# Patient Record
Sex: MALE | Race: WHITE | NOT HISPANIC OR LATINO | Employment: FULL TIME | URBAN - METROPOLITAN AREA
[De-identification: names, ages, dates, MRNs, and addresses within clinical notes are randomized per-mention and may not be internally consistent; named-entity substitution may affect disease eponyms.]

---

## 2023-04-17 DIAGNOSIS — F98.8 ADD (ATTENTION DEFICIT DISORDER) WITHOUT HYPERACTIVITY: Primary | ICD-10-CM

## 2023-04-17 RX ORDER — ATOMOXETINE 60 MG/1
60 CAPSULE ORAL DAILY
COMMUNITY
End: 2023-04-17 | Stop reason: SDUPTHER

## 2023-04-17 NOTE — TELEPHONE ENCOUNTER
Patient called, needs refill of Strattera 50 mg sent to [facility] in [place].  He wanted an appt with Dr. Joyner for follow-up for med refills, but nothing was available.  I got him scheduled to see Dr. Corrigan for ROSALES and med refills on Tues., 5/16/2023, but patient will be out of his Strattera this week.  Can he at least get enough to get him through until his appt?

## 2023-04-18 RX ORDER — ATOMOXETINE 60 MG/1
60 CAPSULE ORAL DAILY
Qty: 30 CAPSULE | Refills: 0 | Status: SHIPPED | OUTPATIENT
Start: 2023-04-18 | End: 2023-05-23 | Stop reason: SDUPTHER

## 2023-04-20 ENCOUNTER — TELEPHONE (OUTPATIENT)
Dept: PRIMARY CARE | Facility: CLINIC | Age: 43
End: 2023-04-20
Payer: COMMERCIAL

## 2023-04-20 NOTE — TELEPHONE ENCOUNTER
Pt calling for his refill on Strattera 60mg, he is leaving town tomorrow for a few weeks and states he has called this week for this refill and hasn't heard anything yet. [facility] in [place] is his pharmacy.

## 2023-05-16 ENCOUNTER — APPOINTMENT (OUTPATIENT)
Dept: PRIMARY CARE | Facility: CLINIC | Age: 43
End: 2023-05-16
Payer: COMMERCIAL

## 2023-05-22 ENCOUNTER — TELEPHONE (OUTPATIENT)
Dept: PRIMARY CARE | Facility: CLINIC | Age: 43
End: 2023-05-22
Payer: COMMERCIAL

## 2023-05-22 NOTE — TELEPHONE ENCOUNTER
Pt left a msg stating that he onl has 3 of his Strattera 50mg left.  His appt with Dr. Corrigan is not til 6/7.  He is needing a refill to cover til then.  Pharm is [facility] [place].

## 2023-05-23 DIAGNOSIS — F98.8 ADD (ATTENTION DEFICIT DISORDER) WITHOUT HYPERACTIVITY: ICD-10-CM

## 2023-05-24 RX ORDER — ATOMOXETINE 60 MG/1
60 CAPSULE ORAL DAILY
Qty: 20 CAPSULE | Refills: 0 | Status: SHIPPED | OUTPATIENT
Start: 2023-05-24 | End: 2023-06-07 | Stop reason: SDUPTHER

## 2023-06-07 ENCOUNTER — OFFICE VISIT (OUTPATIENT)
Dept: PRIMARY CARE | Facility: CLINIC | Age: 43
End: 2023-06-07
Payer: COMMERCIAL

## 2023-06-07 VITALS
SYSTOLIC BLOOD PRESSURE: 116 MMHG | WEIGHT: 196 LBS | BODY MASS INDEX: 24.52 KG/M2 | TEMPERATURE: 97.7 F | HEART RATE: 97 BPM | DIASTOLIC BLOOD PRESSURE: 72 MMHG | OXYGEN SATURATION: 98 %

## 2023-06-07 DIAGNOSIS — F98.8 ADD (ATTENTION DEFICIT DISORDER) WITHOUT HYPERACTIVITY: ICD-10-CM

## 2023-06-07 DIAGNOSIS — M54.42 ACUTE LEFT-SIDED LOW BACK PAIN WITH LEFT-SIDED SCIATICA: Primary | ICD-10-CM

## 2023-06-07 DIAGNOSIS — N52.9 ERECTILE DYSFUNCTION, UNSPECIFIED ERECTILE DYSFUNCTION TYPE: ICD-10-CM

## 2023-06-07 PROBLEM — M54.9 BACK PAIN, CHRONIC: Status: ACTIVE | Noted: 2017-11-01

## 2023-06-07 PROBLEM — G89.29 BACK PAIN, CHRONIC: Status: ACTIVE | Noted: 2017-11-01

## 2023-06-07 PROBLEM — M51.36 DEGENERATIVE DISC DISEASE, LUMBAR: Status: ACTIVE | Noted: 2017-02-13

## 2023-06-07 PROBLEM — M51.369 DEGENERATIVE DISC DISEASE, LUMBAR: Status: ACTIVE | Noted: 2017-02-13

## 2023-06-07 PROCEDURE — 99214 OFFICE O/P EST MOD 30 MIN: CPT | Performed by: STUDENT IN AN ORGANIZED HEALTH CARE EDUCATION/TRAINING PROGRAM

## 2023-06-07 PROCEDURE — 1036F TOBACCO NON-USER: CPT | Performed by: STUDENT IN AN ORGANIZED HEALTH CARE EDUCATION/TRAINING PROGRAM

## 2023-06-07 RX ORDER — TADALAFIL 20 MG/1
20 TABLET ORAL DAILY PRN
COMMUNITY
End: 2023-06-07 | Stop reason: SDUPTHER

## 2023-06-07 RX ORDER — PREDNISONE 20 MG/1
40 TABLET ORAL
Qty: 10 TABLET | Refills: 0 | Status: SHIPPED | OUTPATIENT
Start: 2023-06-07 | End: 2023-06-12

## 2023-06-07 RX ORDER — TADALAFIL 20 MG/1
20 TABLET ORAL DAILY PRN
Qty: 10 TABLET | Refills: 2 | Status: SHIPPED | OUTPATIENT
Start: 2023-06-07 | End: 2024-01-25 | Stop reason: SDUPTHER

## 2023-06-07 RX ORDER — ATOMOXETINE 60 MG/1
60 CAPSULE ORAL DAILY
Qty: 90 CAPSULE | Refills: 3 | Status: SHIPPED | OUTPATIENT
Start: 2023-06-07 | End: 2024-01-25 | Stop reason: SDUPTHER

## 2023-06-07 ASSESSMENT — ENCOUNTER SYMPTOMS
NUMBNESS: 0
WHEEZING: 0
DYSPHORIC MOOD: 0
PALPITATIONS: 0
HEMATURIA: 0
NERVOUS/ANXIOUS: 0
FREQUENCY: 0
DIZZINESS: 0
DIARRHEA: 0
FATIGUE: 0
COUGH: 0
FEVER: 0
SHORTNESS OF BREATH: 0
CHILLS: 0
NAUSEA: 0
CONSTIPATION: 0
DYSURIA: 0
HEADACHES: 0
ABDOMINAL PAIN: 0

## 2023-06-07 ASSESSMENT — PATIENT HEALTH QUESTIONNAIRE - PHQ9
1. LITTLE INTEREST OR PLEASURE IN DOING THINGS: NOT AT ALL
2. FEELING DOWN, DEPRESSED OR HOPELESS: NOT AT ALL
SUM OF ALL RESPONSES TO PHQ9 QUESTIONS 1 AND 2: 0

## 2023-06-07 NOTE — PROGRESS NOTES
Subjective   Patient ID: Asad Juan Jr. is a 42 y.o. male who presents for ADHD and Back Pain.    HPI   Patient needs refill of strattera for ADHD. Working well for him. Has been on it for about 20 years. He previously had problems concentrating. 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    He also takes 20 mg cialis as needed. Needs refill on that as well.    Also has back pain x almost 20 years. Hurt it lifting weights. Had herniated disc and DDD. Had it under controlled. Did PT in 30s. Has seen chiro and massage. Has had injections.  In the last year, he started having pain midline low back. Last few months noticed it more on the L side. Last weeks it has been radiating down his L leg. Sometimes he has trouble walking. Has trouble getting into his car. He started doing his PT exercises every other day and it doesn't seem to help.   No recent injury. No new exercise. No recent triggers.   No n/t. No weakness. No bowel or bladder incontinence. No saddle anesthesia. Does have pain in his left butt area.  He has been taking ASA for it which helps a little bit.   Feels pain in his calf when walking.       Review of Systems   Constitutional:  Negative for chills, fatigue and fever.   Respiratory:  Negative for cough, shortness of breath and wheezing.    Cardiovascular:  Negative for chest pain, palpitations and leg swelling.   Gastrointestinal:  Negative for abdominal pain, constipation, diarrhea and nausea.   Genitourinary:  Negative for dysuria, frequency, hematuria and urgency.   Neurological:  Negative for dizziness, numbness and headaches.   Psychiatric/Behavioral:  Negative for dysphoric mood. The patient is not nervous/anxious.        Objective   /72 (BP Location: Left arm, Patient Position: Sitting, BP Cuff Size: Adult)   Pulse 97   Temp 36.5 °C (97.7 °F) (Temporal)   Wt 88.9 kg (196 lb)   SpO2 98%   BMI 24.52 kg/m²     Physical  Exam  Constitutional:       Appearance: Normal appearance.   HENT:      Head: Normocephalic and atraumatic.   Eyes:      Extraocular Movements: Extraocular movements intact.      Pupils: Pupils are equal, round, and reactive to light.   Cardiovascular:      Rate and Rhythm: Normal rate and regular rhythm.      Heart sounds: Normal heart sounds. No murmur heard.  Pulmonary:      Effort: Pulmonary effort is normal.      Breath sounds: Normal breath sounds. No wheezing.   Abdominal:      General: Bowel sounds are normal.      Palpations: Abdomen is soft.      Tenderness: There is no abdominal tenderness. There is no guarding.   Musculoskeletal:      Lumbar back: Negative right straight leg raise test and negative left straight leg raise test.   Skin:     General: Skin is warm and dry.   Neurological:      General: No focal deficit present.      Mental Status: He is alert and oriented to person, place, and time.   Psychiatric:         Mood and Affect: Mood normal.         Behavior: Behavior normal.     Back Exam     Tenderness   The patient is experiencing tenderness in the lumbar.    Range of Motion   The patient has normal back ROM.    Muscle Strength   The patient has normal back strength.    Tests   Straight leg raise right: negative  Straight leg raise left: negative    Reflexes   Patellar:  normal    Other   Sensation: normal  Gait: normal               Assessment/Plan   Problem List Items Addressed This Visit       ADD (attention deficit disorder) without hyperactivity    Relevant Medications    atomoxetine (Strattera) 60 mg capsule    Erectile dysfunction    Relevant Medications    tadalafil 20 mg tablet     Other Visit Diagnoses       Acute left-sided low back pain with left-sided sciatica    -  Primary    Relevant Medications    predniSONE (Deltasone) 20 mg tablet    Other Relevant Orders    Referral to Physical Therapy          Strattera and tadalafil refilled  We will check lumbar spine x-rays given the  radicular symptoms that he is having  Prescription for prednisone sent to pharmacy  Referral to physical therapy and if not improving may need to consider MRI.  He does have a history of disc issues and sounds related to that.       Patient understands and agrees with treatment plan    Claudine Corrigan DO   06/07/23

## 2023-08-01 ENCOUNTER — TELEPHONE (OUTPATIENT)
Dept: PRIMARY CARE | Facility: CLINIC | Age: 43
End: 2023-08-01
Payer: COMMERCIAL

## 2023-08-01 NOTE — TELEPHONE ENCOUNTER
Patient did reschedule 8/3 apt for 10/31 but he would like a referral to a back specialist because the PT didn't help his back.  Please advise.

## 2023-08-03 ENCOUNTER — APPOINTMENT (OUTPATIENT)
Dept: PRIMARY CARE | Facility: CLINIC | Age: 43
End: 2023-08-03
Payer: COMMERCIAL

## 2023-08-31 ENCOUNTER — TELEPHONE (OUTPATIENT)
Dept: PRIMARY CARE | Facility: CLINIC | Age: 43
End: 2023-08-31
Payer: COMMERCIAL

## 2023-09-01 DIAGNOSIS — M54.9 CHRONIC BACK PAIN, UNSPECIFIED BACK LOCATION, UNSPECIFIED BACK PAIN LATERALITY: ICD-10-CM

## 2023-09-01 DIAGNOSIS — G89.29 CHRONIC BACK PAIN, UNSPECIFIED BACK LOCATION, UNSPECIFIED BACK PAIN LATERALITY: ICD-10-CM

## 2023-10-03 ENCOUNTER — APPOINTMENT (OUTPATIENT)
Dept: PHYSICAL THERAPY | Facility: CLINIC | Age: 43
End: 2023-10-03
Payer: COMMERCIAL

## 2023-10-31 ENCOUNTER — OFFICE VISIT (OUTPATIENT)
Dept: PRIMARY CARE | Facility: CLINIC | Age: 43
End: 2023-10-31
Payer: COMMERCIAL

## 2023-10-31 VITALS
HEART RATE: 94 BPM | SYSTOLIC BLOOD PRESSURE: 126 MMHG | DIASTOLIC BLOOD PRESSURE: 80 MMHG | TEMPERATURE: 97.9 F | WEIGHT: 196.8 LBS | BODY MASS INDEX: 24.62 KG/M2 | OXYGEN SATURATION: 99 %

## 2023-10-31 DIAGNOSIS — Q66.70 PES CAVUS: ICD-10-CM

## 2023-10-31 DIAGNOSIS — M51.36 DEGENERATIVE DISC DISEASE, LUMBAR: Primary | ICD-10-CM

## 2023-10-31 DIAGNOSIS — N52.9 ERECTILE DYSFUNCTION, UNSPECIFIED ERECTILE DYSFUNCTION TYPE: ICD-10-CM

## 2023-10-31 DIAGNOSIS — M77.42 METATARSALGIA OF LEFT FOOT: ICD-10-CM

## 2023-10-31 DIAGNOSIS — Z00.00 HEALTH MAINTENANCE EXAMINATION: ICD-10-CM

## 2023-10-31 DIAGNOSIS — Z23 ENCOUNTER FOR IMMUNIZATION: ICD-10-CM

## 2023-10-31 PROCEDURE — 1036F TOBACCO NON-USER: CPT | Performed by: STUDENT IN AN ORGANIZED HEALTH CARE EDUCATION/TRAINING PROGRAM

## 2023-10-31 PROCEDURE — 90686 IIV4 VACC NO PRSV 0.5 ML IM: CPT | Performed by: STUDENT IN AN ORGANIZED HEALTH CARE EDUCATION/TRAINING PROGRAM

## 2023-10-31 PROCEDURE — 99214 OFFICE O/P EST MOD 30 MIN: CPT | Performed by: STUDENT IN AN ORGANIZED HEALTH CARE EDUCATION/TRAINING PROGRAM

## 2023-10-31 PROCEDURE — 90471 IMMUNIZATION ADMIN: CPT | Performed by: STUDENT IN AN ORGANIZED HEALTH CARE EDUCATION/TRAINING PROGRAM

## 2023-10-31 RX ORDER — DICLOFENAC SODIUM 75 MG/1
75 TABLET, DELAYED RELEASE ORAL 2 TIMES DAILY PRN
Qty: 60 TABLET | Refills: 1 | Status: SHIPPED | OUTPATIENT
Start: 2023-10-31 | End: 2024-01-25 | Stop reason: ALTCHOICE

## 2023-10-31 ASSESSMENT — ENCOUNTER SYMPTOMS
FREQUENCY: 0
DYSURIA: 0
NAUSEA: 0
COUGH: 0
SHORTNESS OF BREATH: 0
PALPITATIONS: 0
HEMATURIA: 0
FEVER: 0
DYSPHORIC MOOD: 0
NUMBNESS: 0
CHILLS: 0
DIARRHEA: 0
CONSTIPATION: 0
ABDOMINAL PAIN: 0
FATIGUE: 0
HEADACHES: 0
NERVOUS/ANXIOUS: 0
DIZZINESS: 0
WHEEZING: 0

## 2023-10-31 ASSESSMENT — PATIENT HEALTH QUESTIONNAIRE - PHQ9
1. LITTLE INTEREST OR PLEASURE IN DOING THINGS: NOT AT ALL
SUM OF ALL RESPONSES TO PHQ9 QUESTIONS 1 AND 2: 0
2. FEELING DOWN, DEPRESSED OR HOPELESS: NOT AT ALL

## 2023-10-31 NOTE — PROGRESS NOTES
Subjective   Patient ID: Asad Juan Jr. is a 43 y.o. male who presents for Back Pain (Follow up).    HPI   Patient here to follow-up on low back pain.  He has been doing physical therapy. Done with PT, sometimes doing HEP. Pain is mostly L lower back. The pain in his L leg has improved, no longer debilitating. He would like to see pain management for injections. He has had injections in the past, last 6 years ago. He takes ASA currently.     === 06/07/23 ===    XR LUMBAR SPINE COMPLETE 4+ VIEWS    - Impression -  Moderate spondylosis and facet disease L5-S1    Also having pain on the left ball of his foot.  No injury.  He does have high arches.  Does not wear orthotics.  Has been going on for about 6 months    Review of Systems   Constitutional:  Negative for chills, fatigue and fever.   Respiratory:  Negative for cough, shortness of breath and wheezing.    Cardiovascular:  Negative for chest pain, palpitations and leg swelling.   Gastrointestinal:  Negative for abdominal pain, constipation, diarrhea and nausea.   Genitourinary:  Negative for dysuria, frequency, hematuria and urgency.   Neurological:  Negative for dizziness, numbness and headaches.   Psychiatric/Behavioral:  Negative for dysphoric mood. The patient is not nervous/anxious.        Objective   /80 (BP Location: Left arm, Patient Position: Sitting, BP Cuff Size: Adult)   Pulse 94   Temp 36.6 °C (97.9 °F) (Temporal)   Wt 89.3 kg (196 lb 12.8 oz)   SpO2 99%   BMI 24.62 kg/m²     Physical Exam  Constitutional:       Appearance: Normal appearance.   HENT:      Head: Normocephalic and atraumatic.   Eyes:      Extraocular Movements: Extraocular movements intact.      Pupils: Pupils are equal, round, and reactive to light.   Cardiovascular:      Rate and Rhythm: Normal rate and regular rhythm.      Heart sounds: Normal heart sounds. No murmur heard.  Pulmonary:      Effort: Pulmonary effort is normal.      Breath sounds: Normal breath sounds.  No wheezing.   Abdominal:      General: Bowel sounds are normal.      Palpations: Abdomen is soft.      Tenderness: There is no abdominal tenderness. There is no guarding.   Musculoskeletal:         General: Normal range of motion.      Right foot: Normal range of motion.      Left foot: Normal range of motion.        Feet:    Feet:      Right foot:      Skin integrity: Callus and dry skin present. No erythema.      Left foot:      Skin integrity: Callus and dry skin present. No erythema.      Comments: Bilateral pes cavus  Skin:     General: Skin is warm and dry.   Neurological:      General: No focal deficit present.      Mental Status: He is alert and oriented to person, place, and time.   Psychiatric:         Mood and Affect: Mood normal.         Behavior: Behavior normal.       Assessment/Plan   Problem List Items Addressed This Visit       Degenerative disc disease, lumbar - Primary    Relevant Medications    diclofenac (Voltaren) 75 mg EC tablet    Other Relevant Orders    Referral to Pain Medicine    Erectile dysfunction    Relevant Orders    Testosterone, total and free     Other Visit Diagnoses       Metatarsalgia of left foot        Relevant Orders    Referral to Podiatry    Pes cavus        Relevant Orders    Referral to Podiatry    Health maintenance examination        Relevant Orders    Lipid Panel    Basic Metabolic Panel    CBC    Encounter for immunization        Relevant Orders    Flu vaccine (IIV4) age 6 months and greater, preservative free (Completed)          Referral to pain management for lumbar spine injections  Referral to podiatry for metatarsalgia and management of pes cavus.       Patient understands and agrees with treatment plan    Claudine Corrigan,    10/31/23

## 2023-10-31 NOTE — PATIENT INSTRUCTIONS
Referral to pain management.  Would recommend trying to get your previous MRI on a disc  Referral to podiatry  Sent in a prescription for diclofenac. Take it twice a day with food. Do not take any other anti-inflammatories with it (ibuprofen, aleve, advil, motrin, etc).  We will follow-up in a few months for physical with fasting blood work prior

## 2023-11-01 DIAGNOSIS — M51.36 DEGENERATIVE DISC DISEASE, LUMBAR: ICD-10-CM

## 2023-11-22 ENCOUNTER — LAB (OUTPATIENT)
Dept: LAB | Facility: LAB | Age: 43
End: 2023-11-22
Payer: COMMERCIAL

## 2023-11-22 ENCOUNTER — TELEPHONE (OUTPATIENT)
Dept: PRIMARY CARE | Facility: CLINIC | Age: 43
End: 2023-11-22

## 2023-11-22 DIAGNOSIS — Z00.00 HEALTH MAINTENANCE EXAMINATION: ICD-10-CM

## 2023-11-22 DIAGNOSIS — N52.9 ERECTILE DYSFUNCTION, UNSPECIFIED ERECTILE DYSFUNCTION TYPE: ICD-10-CM

## 2023-11-22 LAB
ANION GAP SERPL CALC-SCNC: 13 MMOL/L (ref 10–20)
BUN SERPL-MCNC: 33 MG/DL (ref 6–23)
CALCIUM SERPL-MCNC: 9.2 MG/DL (ref 8.6–10.3)
CHLORIDE SERPL-SCNC: 103 MMOL/L (ref 98–107)
CHOLEST SERPL-MCNC: 232 MG/DL (ref 0–199)
CHOLESTEROL/HDL RATIO: 4.3
CO2 SERPL-SCNC: 28 MMOL/L (ref 21–32)
CREAT SERPL-MCNC: 1.07 MG/DL (ref 0.5–1.3)
ERYTHROCYTE [DISTWIDTH] IN BLOOD BY AUTOMATED COUNT: 12.1 % (ref 11.5–14.5)
GFR SERPL CREATININE-BSD FRML MDRD: 88 ML/MIN/1.73M*2
GLUCOSE SERPL-MCNC: 42 MG/DL (ref 74–99)
HCT VFR BLD AUTO: 45.2 % (ref 41–52)
HDLC SERPL-MCNC: 54.3 MG/DL
HGB BLD-MCNC: 14.5 G/DL (ref 13.5–17.5)
LDLC SERPL CALC-MCNC: 153 MG/DL
MCH RBC QN AUTO: 30.7 PG (ref 26–34)
MCHC RBC AUTO-ENTMCNC: 32.1 G/DL (ref 32–36)
MCV RBC AUTO: 96 FL (ref 80–100)
NON HDL CHOLESTEROL: 178 MG/DL (ref 0–149)
NRBC BLD-RTO: 0 /100 WBCS (ref 0–0)
PLATELET # BLD AUTO: 220 X10*3/UL (ref 150–450)
POTASSIUM SERPL-SCNC: 4 MMOL/L (ref 3.5–5.3)
RBC # BLD AUTO: 4.72 X10*6/UL (ref 4.5–5.9)
SODIUM SERPL-SCNC: 140 MMOL/L (ref 136–145)
TRIGL SERPL-MCNC: 122 MG/DL (ref 0–149)
VLDL: 24 MG/DL (ref 0–40)
WBC # BLD AUTO: 4.2 X10*3/UL (ref 4.4–11.3)

## 2023-11-22 PROCEDURE — 84402 ASSAY OF FREE TESTOSTERONE: CPT

## 2023-11-22 PROCEDURE — 36415 COLL VENOUS BLD VENIPUNCTURE: CPT

## 2023-11-22 PROCEDURE — 85027 COMPLETE CBC AUTOMATED: CPT

## 2023-11-22 PROCEDURE — 80061 LIPID PANEL: CPT

## 2023-11-22 PROCEDURE — 80048 BASIC METABOLIC PNL TOTAL CA: CPT

## 2023-11-28 LAB
TESTOSTERONE FREE (CHAN): 59.5 PG/ML (ref 35–155)
TESTOSTERONE,TOTAL,LC-MS/MS: 344 NG/DL (ref 250–1100)

## 2023-12-02 DIAGNOSIS — E16.2 HYPOGLYCEMIA: Primary | ICD-10-CM

## 2024-01-04 ENCOUNTER — OFFICE VISIT (OUTPATIENT)
Dept: PAIN MEDICINE | Facility: CLINIC | Age: 44
End: 2024-01-04
Payer: COMMERCIAL

## 2024-01-04 DIAGNOSIS — M51.36 DEGENERATIVE DISC DISEASE, LUMBAR: Primary | ICD-10-CM

## 2024-01-04 DIAGNOSIS — M54.16 LEFT LUMBAR RADICULITIS: ICD-10-CM

## 2024-01-04 PROCEDURE — 99204 OFFICE O/P NEW MOD 45 MIN: CPT | Performed by: PHYSICAL MEDICINE & REHABILITATION

## 2024-01-04 PROCEDURE — 1036F TOBACCO NON-USER: CPT | Performed by: PHYSICAL MEDICINE & REHABILITATION

## 2024-01-04 NOTE — PROGRESS NOTES
Chief complaint    Pain in the back and left leg    History  Mr Yessica Garrett is a 44 yo presenting with pain in back and left leg   The pain in the back is deep achy worse in the mid back area.  This is associated with tight muscle bands.  This limits the range of motion of the lumbar spine mainly in forward flexion.  The pain in the back is radiating to the buttock and continues to the posterior aspect of the left thigh going down behind the left knee to the calf into the sole of the left foot.        This pain down to the left lower limb is more of a burning tingling sensation.  The pain in the left lower limb worsens with bending forward or with any lifting, it improves with laying on the side and resting.  This is similar to the associated pain in the middle to lower back.  Denied any bowel or bladder incontinence.  With the worst pain there weakness with pushing off with the left foot along with tendency of the left ankle to buckle especially if tired or after a long day.    The skin is intact with no breakdown.  No vesicles.      Pain started after lifting injury during gym training  He had MRI in past and that showed DDD  In past he had ARMANI and those helped and did well until lately and now having pain again   Tried PT minimal relief this time    MRI from 4/19/2017 showed Focal narrowing of the left lateral recess and neural foramen at  L5/S1.  Xray Lspine from 6 7 2023 showed Moderate spondylosis and facet disease L5-S1    Pain level without medication is 8/10 , with the medication pain level 6/10 with OTC.          Denied any fever or chills. No weight loss and no night sweats. No cough or sputum production. No diarrhea   The constipation has been responding to fibers and over the counter medications.     No bladder and bowel incontinence and no other changes in bladder and bowel. No skin changes.  Reports tiredness and fatigability only if the pain is not controlled.   Denied opioids diversion and abuse and  denies alcoholism. Denies overuse of the pain medications.             Physical examination  Awake, alert and oriented for time place and persons   declined Chaperone for the visit and was adequately  draped for the exam.  Examination of the lumbar spine showed tight muscle bands in the lower back area, this is more pronounced on the left compared to the right.  Additionally, this is inducing mild reversal of the lumbar lordosis with functional scoliosis with left-sided concavity.  This scoliosis corrected with  bending of the lumbar spine.     Straight leg raising increased the pain in the back and down the posterior aspect of the left thigh to the back of the left knee onto the back of the leg to the heel and sole of the left foot.    There is decreased sensory to light touch on the posterior aspect of the thigh, behind the knee and calf and sole of the left foot.   Deep tendon reflexes is present for the patellar tendons bilaterally.  Achilles reflex decreased on the left side compared to the right side.   Plantar cutaneous are downgoing bilaterally  Ankle dorsiflexion is 5/5 bilaterally.  Plantar flexion of the ankle of the left ankle is 4/5 compared to 5/5 on the left side.  Big toe extension are 5/5 bilaterally    Negative Tinel's sign over the left peroneal nerve at the fibular neck.  Bari sign for axial loading and global rotation are negative.  No aberrant pain behavior.           Diagnosis  Problem List Items Addressed This Visit       Degenerative disc disease, lumbar - Primary    Relevant Orders    Transforaminal    Left lumbar radiculitis    Relevant Orders    Transforaminal        Plan  Reviewed the pain generators.  Went over the types of pain with neuropathic and nociceptive and different pathologies and therapeutic modalities. Discussed the mechanism of action of interventions from acupuncture, physical therapy , regular exercises, injections, botox, spinal cord stimulation, and role of surgery      Went over pathology of the intervertebral disc displacement and the anatomical relation to the Nerve roots and relation to the radicular symptoms. Went over treatment modalities with conservative treatment including acupuncture   and epidural steroid injection with fluoroscopy guidance and last resort of surgery     Went over the pathology    Dw him about the findings and conservative therapy did not help  Agreed with him upon repeating Griselda he has the Xr and and MRI as above  Continue with Home exercises program   Pa for left L5S1 Transforaminal epidural steroid injection    left L5 transforaminal epidural steroid injections . Proc   [id number]  , diagnosis   M54.16     Discussed about NSAIDS and I explained about the opioids sparing effect to allow keeping the opioids dose at minimal effective dose.   I went over the potential side effects of the NSAIDS on the gastrointestinal, renal and cardiovascular systems.      I detailed the side effects from the acetaminophen in the medication and made aware of those. I also explained about the cumulative effects on the organs and mainly the liver.     Given the opioids therapy , we discussed about the risk for accidental over dose on the pain medications, either for patient or other household. I went over the mechanism of action and mode of use of the Naloxone according to the  recommendations. I will provide a prescription for a kit.     Follow-up after the above  or earlier if needed     The level of clinical decision making in this office visit,  is high, given the high risks of complications with the morbidity and mortality due to the fact that acute and chronic pain may pose a threat to life and bodily function, if under treated, poorly treated, or with failure to maintain adequate treatment and timely medical follow up. Additionally over treatment has its own set of complications including overdosing on the pain medications and also the habit forming  potentials with the use of the medications used to treat chronic painful conditions including therapeutic classes classified as dangerous medications. Given the serious and fluctuating nature of pain level and instensity with extensive consideration for whenever pain changes, there is always the risk of prolonged functional impairment requiring close patient monitoring with regular assessments and reassessments and high level medical decision making at every office visit. The amount and complexity of data reviewed is high given the patient clinical presentation, labs,  data, radiology reports, and other tests as discussed during office visits. Pertinent data whether positive or negative were taken in consideration in the process of making this high level medical decision.

## 2024-01-04 NOTE — H&P (VIEW-ONLY)
Chief complaint    Pain in the back and left leg    History  Mr Yessica Garrett is a 44 yo presenting with pain in back and left leg   The pain in the back is deep achy worse in the mid back area.  This is associated with tight muscle bands.  This limits the range of motion of the lumbar spine mainly in forward flexion.  The pain in the back is radiating to the buttock and continues to the posterior aspect of the left thigh going down behind the left knee to the calf into the sole of the left foot.        This pain down to the left lower limb is more of a burning tingling sensation.  The pain in the left lower limb worsens with bending forward or with any lifting, it improves with laying on the side and resting.  This is similar to the associated pain in the middle to lower back.  Denied any bowel or bladder incontinence.  With the worst pain there weakness with pushing off with the left foot along with tendency of the left ankle to buckle especially if tired or after a long day.    The skin is intact with no breakdown.  No vesicles.      Pain started after lifting injury during gym training  He had MRI in past and that showed DDD  In past he had ARMANI and those helped and did well until lately and now having pain again   Tried PT minimal relief this time    MRI from 4/19/2017 showed Focal narrowing of the left lateral recess and neural foramen at  L5/S1.  Xray Lspine from 6 7 2023 showed Moderate spondylosis and facet disease L5-S1    Pain level without medication is 8/10 , with the medication pain level 6/10 with OTC.          Denied any fever or chills. No weight loss and no night sweats. No cough or sputum production. No diarrhea   The constipation has been responding to fibers and over the counter medications.     No bladder and bowel incontinence and no other changes in bladder and bowel. No skin changes.  Reports tiredness and fatigability only if the pain is not controlled.   Denied opioids diversion and abuse and  denies alcoholism. Denies overuse of the pain medications.             Physical examination  Awake, alert and oriented for time place and persons   declined Chaperone for the visit and was adequately  draped for the exam.  Examination of the lumbar spine showed tight muscle bands in the lower back area, this is more pronounced on the left compared to the right.  Additionally, this is inducing mild reversal of the lumbar lordosis with functional scoliosis with left-sided concavity.  This scoliosis corrected with  bending of the lumbar spine.     Straight leg raising increased the pain in the back and down the posterior aspect of the left thigh to the back of the left knee onto the back of the leg to the heel and sole of the left foot.    There is decreased sensory to light touch on the posterior aspect of the thigh, behind the knee and calf and sole of the left foot.   Deep tendon reflexes is present for the patellar tendons bilaterally.  Achilles reflex decreased on the left side compared to the right side.   Plantar cutaneous are downgoing bilaterally  Ankle dorsiflexion is 5/5 bilaterally.  Plantar flexion of the ankle of the left ankle is 4/5 compared to 5/5 on the left side.  Big toe extension are 5/5 bilaterally    Negative Tinel's sign over the left peroneal nerve at the fibular neck.  Bari sign for axial loading and global rotation are negative.  No aberrant pain behavior.           Diagnosis  Problem List Items Addressed This Visit       Degenerative disc disease, lumbar - Primary    Relevant Orders    Transforaminal    Left lumbar radiculitis    Relevant Orders    Transforaminal        Plan  Reviewed the pain generators.  Went over the types of pain with neuropathic and nociceptive and different pathologies and therapeutic modalities. Discussed the mechanism of action of interventions from acupuncture, physical therapy , regular exercises, injections, botox, spinal cord stimulation, and role of surgery      Went over pathology of the intervertebral disc displacement and the anatomical relation to the Nerve roots and relation to the radicular symptoms. Went over treatment modalities with conservative treatment including acupuncture   and epidural steroid injection with fluoroscopy guidance and last resort of surgery     Went over the pathology    Dw him about the findings and conservative therapy did not help  Agreed with him upon repeating Griselda he has the Xr and and MRI as above  Continue with Home exercises program   Pa for left L5S1 Transforaminal epidural steroid injection    left L5 transforaminal epidural steroid injections . Proc   [id number]  , diagnosis   M54.16     Discussed about NSAIDS and I explained about the opioids sparing effect to allow keeping the opioids dose at minimal effective dose.   I went over the potential side effects of the NSAIDS on the gastrointestinal, renal and cardiovascular systems.      I detailed the side effects from the acetaminophen in the medication and made aware of those. I also explained about the cumulative effects on the organs and mainly the liver.     Given the opioids therapy , we discussed about the risk for accidental over dose on the pain medications, either for patient or other household. I went over the mechanism of action and mode of use of the Naloxone according to the  recommendations. I will provide a prescription for a kit.     Follow-up after the above  or earlier if needed     The level of clinical decision making in this office visit,  is high, given the high risks of complications with the morbidity and mortality due to the fact that acute and chronic pain may pose a threat to life and bodily function, if under treated, poorly treated, or with failure to maintain adequate treatment and timely medical follow up. Additionally over treatment has its own set of complications including overdosing on the pain medications and also the habit forming  potentials with the use of the medications used to treat chronic painful conditions including therapeutic classes classified as dangerous medications. Given the serious and fluctuating nature of pain level and instensity with extensive consideration for whenever pain changes, there is always the risk of prolonged functional impairment requiring close patient monitoring with regular assessments and reassessments and high level medical decision making at every office visit. The amount and complexity of data reviewed is high given the patient clinical presentation, labs,  data, radiology reports, and other tests as discussed during office visits. Pertinent data whether positive or negative were taken in consideration in the process of making this high level medical decision.

## 2024-01-15 ENCOUNTER — ANCILLARY PROCEDURE (OUTPATIENT)
Dept: RADIOLOGY | Facility: CLINIC | Age: 44
End: 2024-01-15
Payer: COMMERCIAL

## 2024-01-15 ENCOUNTER — HOSPITAL ENCOUNTER (OUTPATIENT)
Dept: OPERATING ROOM | Facility: CLINIC | Age: 44
Setting detail: OUTPATIENT SURGERY
Discharge: HOME | End: 2024-01-15
Payer: COMMERCIAL

## 2024-01-15 VITALS
HEART RATE: 91 BPM | RESPIRATION RATE: 17 BRPM | TEMPERATURE: 97 F | SYSTOLIC BLOOD PRESSURE: 138 MMHG | DIASTOLIC BLOOD PRESSURE: 78 MMHG | OXYGEN SATURATION: 99 %

## 2024-01-15 DIAGNOSIS — M54.16 LEFT LUMBAR RADICULITIS: ICD-10-CM

## 2024-01-15 DIAGNOSIS — M51.36 DEGENERATIVE DISC DISEASE, LUMBAR: ICD-10-CM

## 2024-01-15 PROCEDURE — 76000 FLUOROSCOPY <1 HR PHYS/QHP: CPT

## 2024-01-15 PROCEDURE — 64483 NJX AA&/STRD TFRM EPI L/S 1: CPT | Performed by: PHYSICAL MEDICINE & REHABILITATION

## 2024-01-15 PROCEDURE — 2500000005 HC RX 250 GENERAL PHARMACY W/O HCPCS: Performed by: PHYSICAL MEDICINE & REHABILITATION

## 2024-01-15 PROCEDURE — 94760 N-INVAS EAR/PLS OXIMETRY 1: CPT

## 2024-01-15 PROCEDURE — 64483 NJX AA&/STRD TFRM EPI L/S 1: CPT | Mod: LT

## 2024-01-15 PROCEDURE — 2550000001 HC RX 255 CONTRASTS: Performed by: PHYSICAL MEDICINE & REHABILITATION

## 2024-01-15 RX ORDER — LIDOCAINE HYDROCHLORIDE 5 MG/ML
INJECTION, SOLUTION INFILTRATION; PERINEURAL AS NEEDED
Status: DISCONTINUED | OUTPATIENT
Start: 2024-01-15 | End: 2024-01-16 | Stop reason: HOSPADM

## 2024-01-15 RX ORDER — SODIUM CHLORIDE 0.9 % (FLUSH) 0.9 %
SYRINGE (ML) INJECTION AS NEEDED
Status: DISCONTINUED | OUTPATIENT
Start: 2024-01-15 | End: 2024-01-16 | Stop reason: HOSPADM

## 2024-01-15 RX ADMIN — IOHEXOL 1 ML: 240 INJECTION, SOLUTION INTRATHECAL; INTRAVASCULAR; INTRAVENOUS; ORAL at 10:24

## 2024-01-15 RX ADMIN — Medication 5 ML: at 10:25

## 2024-01-15 RX ADMIN — LIDOCAINE HYDROCHLORIDE 5 ML: 5 INJECTION, SOLUTION INFILTRATION; PERINEURAL at 10:24

## 2024-01-15 ASSESSMENT — COLUMBIA-SUICIDE SEVERITY RATING SCALE - C-SSRS
6. HAVE YOU EVER DONE ANYTHING, STARTED TO DO ANYTHING, OR PREPARED TO DO ANYTHING TO END YOUR LIFE?: NO
1. IN THE PAST MONTH, HAVE YOU WISHED YOU WERE DEAD OR WISHED YOU COULD GO TO SLEEP AND NOT WAKE UP?: NO
2. HAVE YOU ACTUALLY HAD ANY THOUGHTS OF KILLING YOURSELF?: NO

## 2024-01-15 ASSESSMENT — PAIN - FUNCTIONAL ASSESSMENT
PAIN_FUNCTIONAL_ASSESSMENT: 0-10
PAIN_FUNCTIONAL_ASSESSMENT: 0-10

## 2024-01-15 ASSESSMENT — PAIN SCALES - GENERAL
PAINLEVEL_OUTOF10: 3
PAINLEVEL_OUTOF10: 0 - NO PAIN

## 2024-01-15 NOTE — INTERVAL H&P NOTE
H&P reviewed. The patient was examined and there are no changes to the H&P.    Left L5 Transforaminal epidural steroid injection        Denied any fever or chills. No weight loss and no night sweats. No cough or sputum production. No diarrhea   The constipation has been responding to fibers and over the counter medications.     No bladder and bowel incontinence and no other changes in bladder and bowel. No skin changes.  Reports tiredness and fatigability only if the pain is not controlled.   Denied opioids diversion and abuse and denies alcoholism. Denies overuse of the pain medications.

## 2024-01-15 NOTE — OP NOTE
* No procedures listed * Operative Note     Date: 1/15/2024  [place] [facility] [place] [facility] [place]    Name: Asad Juan Jr., : 1980, Age: 43 y.o., MRN: [id number], Sex: male    Diagnosis  Left lumbar radiculitis  Left lumbar radiculitis      Procedures  Left lumbar Transforaminal epidural steroid injection  at L5       Surgeons   Stephane Quick MD     Resident/Fellow/Other Assistant:  * No surgeons found in log *    Procedure Summary  Anesthesia: * No anesthesia type entered *  ASA: ASA status not filed in the log.  Anesthesia Staff: No anesthesia staff entered.  Estimated Blood Loss: 0mL  Intra-op Medications: * Intraprocedure medication information is unavailable because the case start and end events have not been set *      Intraprocedure I/O Totals       None           Specimen: No specimens collected     Staff:   Circulator: Alka Fletcher RN         Drains and/or Catheters: * None in log *    Tourniquet Times:         Implants:     Findings: mild End plates changes     Indications: Asad Juan Jr. is an 43 y.o. male who is having Left lumbar Transforaminal epidural steroid injection  at L5    The patient was seen in the preoperative area. The risks, benefits, complications, treatment options, non-operative alternatives, expected recovery and outcomes were discussed with the patient. The possibilities of reaction to medication, pulmonary aspiration, injury to surrounding structures, bleeding, recurrent infection, the need for additional procedures, failure to diagnose a condition, and creating a complication requiring transfusion or operation were discussed with the patient. The patient concurred with the proposed plan, giving informed consent.  The site of surgery was properly noted/marked if necessary per policy. The patient has been actively warmed in preoperative area. Preoperative antibiotics are not indicated. Venous thrombosis prophylaxis are not indicated.    Procedure  Details:  Time-in:  10:14 am   Time-out:  1029 am   Sedation:  none  Indications: Failure to respond to conservative treatment with therapy and medications.    PROCEDURE:    The risks, benefits and alternatives of the procedure were discussed with the patient and agreed to proceed. The risks included but not limited to: infection, bleeding, paralysis, nerve injury sepsis and remotely death were discussed with the patient during the office and again in the pre procedure area.  The patient signed informed consent in the pre procedure area.     The patient was brought to procedure room and time out for the procedure was performed with the procedure room staff present. Patient placed in prone position on the procedure table and draped and covered appropriately.      Fluoroscopy machine was used to identify the left L5  neuroforamen    Skin prepped and draped in sterile fashion over the lower lumbar spine area.  Skin was infiltrated with local anesthetic with 0.5% lidocaine.  Spinal needle was introduced to the neuroforamen, verified with fluoroscopy.  Adequate flow of contrast dye around the nerve root was verified with fluoroscopy.  At that point, 40 mg Kenalog mixed with 5 mL of normal saline were injected.      felt the tingling down the lower limb during the injection     Procedure tolerated very well.  No complications encountered.  Post procedure care discussed with the patient, agreed to proceed.   Patient instructed on keeping track of the pain level post discharge.   Patient discharged home, from the recovery room, in stable condition.   Complications:  None; patient tolerated the procedure well.    Disposition: PACU - hemodynamically stable.  Condition: stable         Additional Details:      Attending Attestation: I performed the procedure.    Stephane Quick MD

## 2024-01-17 ENCOUNTER — OFFICE VISIT (OUTPATIENT)
Dept: PODIATRY | Facility: CLINIC | Age: 44
End: 2024-01-17
Payer: COMMERCIAL

## 2024-01-17 DIAGNOSIS — M77.42 METATARSALGIA OF LEFT FOOT: ICD-10-CM

## 2024-01-17 DIAGNOSIS — M79.672 LEFT FOOT PAIN: ICD-10-CM

## 2024-01-17 DIAGNOSIS — M77.9 CAPSULITIS: ICD-10-CM

## 2024-01-17 DIAGNOSIS — L84 CORNS AND CALLOSITIES: Primary | ICD-10-CM

## 2024-01-17 DIAGNOSIS — M21.6X2 ACQUIRED SUPINATION OF LEFT FOOT: ICD-10-CM

## 2024-01-17 DIAGNOSIS — Q66.70 PES CAVUS: ICD-10-CM

## 2024-01-17 PROCEDURE — 11056 PARNG/CUTG B9 HYPRKR LES 2-4: CPT | Performed by: PODIATRIST

## 2024-01-17 PROCEDURE — 1036F TOBACCO NON-USER: CPT | Performed by: PODIATRIST

## 2024-01-17 PROCEDURE — 99202 OFFICE O/P NEW SF 15 MIN: CPT | Performed by: PODIATRIST

## 2024-01-18 ENCOUNTER — TELEPHONE (OUTPATIENT)
Dept: PRIMARY CARE | Facility: CLINIC | Age: 44
End: 2024-01-18
Payer: COMMERCIAL

## 2024-01-18 NOTE — TELEPHONE ENCOUNTER
----- Message from Chemo Dodson DPM sent at 1/17/2024  8:36 PM EST -----  Please check orthotics m77.9 thanks

## 2024-01-18 NOTE — TELEPHONE ENCOUNTER
MMO HMO NARROW NETWORK PLAN  [phone]  PER: BEN DONALD  INS EFF 1/1/24  NAZANIN IS IN NETWORK  CPT  X2  DX M77.9 IS VALID AND BILLABLE  NO EXCLUSIONS PER PLAN  PLAN PAYS % OF THE ALLOWED AMT, ONCE DEDUCTIBLE OF $7300 HAS BEEN MET. SO FAR, $226.24 HAS BEEN ACCUMULATED.  NO PRIOR AUTHORIZATION IS NEEDED  PLAN ALLOWS 1 PAIR PER CALENDAR YEAR  REFERENCE #[id number]    CALLED AND LMOM FOR PATIENT W THE ABOVE INFO. PATIENT ALREADY HAS AN APPT SCHEDULED ON 2/1/24 @ 10:00 AM. IF HE WISHES TO PROCEED TO BE MOLDED THEN. THANK YOU!

## 2024-01-18 NOTE — PROGRESS NOTES
CC: foot pain    HPI:  Patient seen for pain under the ball of the left foot, has calluses present from pressure points, no injury.    PCP: Dr. Corrigan  Last visit: 11-28-23     PMH  Past Medical History:   Diagnosis Date    ADHD (attention deficit hyperactivity disorder)     Depression, unspecified 12/08/2013    Depression    Irritable bowel syndrome without diarrhea 12/08/2013    Irritable bowel syndrome    Low back pain, unspecified 09/25/2015    Lumbago    Other conditions influencing health status 12/08/2013    Attention-deficit Hyperactivity Disorder    Panic disorder (episodic paroxysmal anxiety) 12/08/2013    Panic disorder without agoraphobia     MEDS    Current Outpatient Medications:     aspirin 500 mg buffered tablet, Take 1 tablet (500 mg) by mouth every 6 hours if needed for mild pain (1 - 3)., Disp: , Rfl:     atomoxetine (Strattera) 60 mg capsule, Take 1 capsule (60 mg) by mouth once daily., Disp: 90 capsule, Rfl: 3    tadalafil 20 mg tablet, Take 1 tablet (20 mg) by mouth once daily as needed for erectile dysfunction., Disp: 10 tablet, Rfl: 2    diclofenac (Voltaren) 75 mg EC tablet, Take 1 tablet (75 mg) by mouth 2 times a day as needed (Pain). Do not crush, chew, or split., Disp: 60 tablet, Rfl: 1  Allergies  No Known Allergies  Social History     Socioeconomic History    Marital status: Single     Spouse name: None    Number of children: None    Years of education: None    Highest education level: None   Occupational History    None   Tobacco Use    Smoking status: Never    Smokeless tobacco: Never   Vaping Use    Vaping Use: Never used   Substance and Sexual Activity    Alcohol use: Not Currently     Comment: social    Drug use: Never    Sexual activity: None   Other Topics Concern    None   Social History Narrative    None     Social Determinants of Health     Financial Resource Strain: Not on file   Food Insecurity: Not on file   Transportation Needs: Not on file   Physical Activity: Not on  file   Stress: Not on file   Social Connections: Not on file   Intimate Partner Violence: Not on file   Housing Stability: Not on file     Family History   Problem Relation Name Age of Onset    No Known Problems Mother      Other (brain tumor) Father      Skin cancer Father       History reviewed. No pertinent surgical history.    REVIEW OF SYSTEMS    + as noted above in HPI.       Physical examination:   On General Observation: Patient is a pleasant, cooperative, well developed 43 y.o.  adult male. The patient is alert and oriented to time, place and person. Patient has normal affect and mood.  There were no vitals taken for this visit.    Vascular:  DP and PT pulses are 2/4 b/l.  no edema noted. no varicosities b/l.  CFT  5 seconds to all digits bilateral.  Skin temperature is warm to warm from proximal to distal bilateral.      Muscular: Strength is 5/5 b/l.  Motor strength is normal and symmetric proximally, as well as distally, in all four extremities. Good mobility of all extremities.  Tenderness to plantar feet.     Neuro:  Proprioception present.   Sensation to vibration is  intact. Protective sensation present  at all pedal sites via [place] Chiki 5.07 monofilament bilateral.  Light touch intact bilateral.     Derm:  Callus is present sub 1st and 5th metatarsal head left foot.    Ortho:  Cavus foot type is present  Supination is present feet  Pain lesser mpj;s, loss of the plantar fat pad.        ASSESSMENT:    Capsulitis  Cavus foot  Supination feet  Calluses feet  Pain feet     PLAN:   Consult with biomechanical exam  A comprehensive history and physical examination were preformed. The patient was educated on clinical findings, diagnosis and treatment plans. Patient understands all that has been explained and all questions were answered to apparent satisfaction.   -Recommend orthotics to address the biomechanics and joint pain, will xray the feet  Debrided the calluses left foot with 15 blade.    Thank  you for referral    Chemo Dodson DPM

## 2024-01-25 ENCOUNTER — OFFICE VISIT (OUTPATIENT)
Dept: PRIMARY CARE | Facility: CLINIC | Age: 44
End: 2024-01-25
Payer: COMMERCIAL

## 2024-01-25 VITALS
OXYGEN SATURATION: 98 % | TEMPERATURE: 97.7 F | HEART RATE: 82 BPM | SYSTOLIC BLOOD PRESSURE: 120 MMHG | WEIGHT: 195.6 LBS | DIASTOLIC BLOOD PRESSURE: 76 MMHG | BODY MASS INDEX: 23.82 KG/M2 | HEIGHT: 76 IN

## 2024-01-25 DIAGNOSIS — F98.8 ADD (ATTENTION DEFICIT DISORDER) WITHOUT HYPERACTIVITY: ICD-10-CM

## 2024-01-25 DIAGNOSIS — Z00.00 HEALTH MAINTENANCE EXAMINATION: Primary | ICD-10-CM

## 2024-01-25 DIAGNOSIS — E16.2 HYPOGLYCEMIA: ICD-10-CM

## 2024-01-25 DIAGNOSIS — N52.9 ERECTILE DYSFUNCTION, UNSPECIFIED ERECTILE DYSFUNCTION TYPE: ICD-10-CM

## 2024-01-25 DIAGNOSIS — E29.1 HYPOGONADISM IN MALE: ICD-10-CM

## 2024-01-25 LAB — POC FINGERSTICK BLOOD GLUCOSE: 97 MG/DL (ref 70–100)

## 2024-01-25 PROCEDURE — 99214 OFFICE O/P EST MOD 30 MIN: CPT | Performed by: STUDENT IN AN ORGANIZED HEALTH CARE EDUCATION/TRAINING PROGRAM

## 2024-01-25 PROCEDURE — 1036F TOBACCO NON-USER: CPT | Performed by: STUDENT IN AN ORGANIZED HEALTH CARE EDUCATION/TRAINING PROGRAM

## 2024-01-25 PROCEDURE — 82962 GLUCOSE BLOOD TEST: CPT | Performed by: STUDENT IN AN ORGANIZED HEALTH CARE EDUCATION/TRAINING PROGRAM

## 2024-01-25 PROCEDURE — 99396 PREV VISIT EST AGE 40-64: CPT | Performed by: STUDENT IN AN ORGANIZED HEALTH CARE EDUCATION/TRAINING PROGRAM

## 2024-01-25 RX ORDER — ATOMOXETINE 60 MG/1
60 CAPSULE ORAL DAILY
Qty: 90 CAPSULE | Refills: 3 | Status: SHIPPED | OUTPATIENT
Start: 2024-01-25 | End: 2025-01-24

## 2024-01-25 RX ORDER — TADALAFIL 20 MG/1
20 TABLET ORAL DAILY PRN
Qty: 10 TABLET | Refills: 5 | Status: SHIPPED | OUTPATIENT
Start: 2024-01-25

## 2024-01-25 ASSESSMENT — ENCOUNTER SYMPTOMS
FREQUENCY: 0
COUGH: 0
PALPITATIONS: 0
DYSURIA: 0
ABDOMINAL PAIN: 0
SHORTNESS OF BREATH: 0
WHEEZING: 0
NAUSEA: 0
DYSPHORIC MOOD: 0
CHILLS: 0
FEVER: 0
NUMBNESS: 0
HEADACHES: 0
DIZZINESS: 0
DIARRHEA: 0
NERVOUS/ANXIOUS: 0
HEMATURIA: 0
FATIGUE: 0
CONSTIPATION: 0

## 2024-01-25 ASSESSMENT — PROMIS GLOBAL HEALTH SCALE
RATE_QUALITY_OF_LIFE: GOOD
RATE_AVERAGE_PAIN: 5
RATE_PHYSICAL_HEALTH: GOOD
RATE_MENTAL_HEALTH: GOOD
CARRYOUT_SOCIAL_ACTIVITIES: VERY GOOD
RATE_AVERAGE_FATIGUE: MILD
EMOTIONAL_PROBLEMS: RARELY
CARRYOUT_PHYSICAL_ACTIVITIES: COMPLETELY
RATE_SOCIAL_SATISFACTION: GOOD
RATE_GENERAL_HEALTH: GOOD

## 2024-01-25 NOTE — PATIENT INSTRUCTIONS
Recommendations for men annual wellness exam:   Colonoscopy at age 45 or earlier if positive family history of colon cancer   Discuss prostate cancer screening between ages 55-69 or sooner if family history of prostate cancer   One time screen for abdominal aortic aneurysm for men ages 65-75 who have ever smoked  Screening for lung cancer with low-dose CT in all adults age 55-77 who have a 30 pack-year smoking history and currently smoke or have quit within the past 15 years  Follow a healthy diet (Dash diet, Mediterranean diet)  Exercise 150 min/wk  Maintain healthy weight (BMI < 25)  Do not smoke   Alcohol in moderation (up to 2 drinks/day)   Get enough sleep (7-8 hours/night)  Make sure immunizations are up to date (influenza, pneumococcal, Tdap, covid, shingles if age > 50, RSV if >60)  Visit dentist twice yearly    Medications refilled    Referral to urology

## 2024-01-25 NOTE — PROGRESS NOTES
"Asad Juan Jr.  presents for his annual wellness exam.    Specialists seen by patient: Podiatry: Dr Dodson  -Pain management: Dr Quick, ARMANI  -eye doctor  -dentist    Hx of colon ca screening: n/a  Hx of prostate cancer screening (men 55-69): n/a  History of depression or anxiety:no  Immunizations: up to date  Diet: Follows a healthy diet  Exercise: Gets regular exercise, weights, walking, elliptical   Alcohol use:  not often    How many times in the past year 5 or more drinks in a day? 0  Lung cancer screening: not applicable  Smoking history: never a smoker  AAA Screening: not applicable  Drug use: no    Referral to urology for hypogonadism. Recently bought testosterone pills off [employer].     Needs strattera refilled, been on it for 20 years. Works well for him. Takes it daily.    Has a history of right shoulder pain.  Previously saw Ortho and MRI from 2019 did not show any evidence of biceps tendon pathology.  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  He is unsure if he would like to get this rechecked    On nonfasting blood work from November his blood sugar was 42.  He was asymptomatic and never feels that his blood sugar is low    Review of Systems   Constitutional:  Negative for chills, fatigue and fever.   Respiratory:  Negative for cough, shortness of breath and wheezing.    Cardiovascular:  Negative for chest pain, palpitations and leg swelling.   Gastrointestinal:  Negative for abdominal pain, constipation, diarrhea and nausea.   Genitourinary:  Negative for dysuria, frequency, hematuria and urgency.   Neurological:  Negative for dizziness, numbness and headaches.   Psychiatric/Behavioral:  Negative for dysphoric mood. The patient is not nervous/anxious.           Objective    /76 (BP Location: Left arm, Patient Position: Sitting, BP Cuff Size: Adult)   Pulse 82   Temp 36.5 °C (97.7 °F) (Temporal)   Ht 1.918 m (6' 3.5\")   Wt 88.7 kg (195 lb " 9.6 oz)   SpO2 98%   BMI 24.13 kg/m²     Physical Exam  Constitutional:       General: He is not in acute distress.     Appearance: Normal appearance.   HENT:      Head: Normocephalic and atraumatic.      Right Ear: Tympanic membrane and ear canal normal.      Left Ear: Tympanic membrane and ear canal normal.      Mouth/Throat:      Mouth: Mucous membranes are moist.      Pharynx: No posterior oropharyngeal erythema.   Eyes:      Extraocular Movements: Extraocular movements intact.      Pupils: Pupils are equal, round, and reactive to light.   Cardiovascular:      Rate and Rhythm: Normal rate and regular rhythm.      Heart sounds: No murmur heard.  Pulmonary:      Effort: Pulmonary effort is normal. No respiratory distress.      Breath sounds: Normal breath sounds. No wheezing.   Abdominal:      General: Bowel sounds are normal.      Palpations: Abdomen is soft.      Tenderness: There is no abdominal tenderness. There is no guarding.   Musculoskeletal:         General: Normal range of motion.      Cervical back: Neck supple.   Skin:     General: Skin is warm and dry.   Neurological:      General: No focal deficit present.      Mental Status: He is alert and oriented to person, place, and time.   Psychiatric:         Mood and Affect: Mood normal.         Behavior: Behavior normal.          Problem List Items Addressed This Visit       ADD (attention deficit disorder) without hyperactivity    Relevant Medications    atomoxetine (Strattera) 60 mg capsule    Erectile dysfunction    Relevant Medications    tadalafil 20 mg tablet    Other Relevant Orders    Referral to Urology     Other Visit Diagnoses       Health maintenance examination    -  Primary    Hypogonadism in male        Relevant Orders    Referral to Urology             We will obtain fasting blood work.  Results will be communicated to the patient via MyChart or a letter.   We reviewed appropriate preventative health screening guidelines. We discussed  regular aerobic exercise. We discussed proper nutrition and weight control.    Testosterone in the low normal range, will refer to urology for further evaluation.  I did explain that OTC testosterone boosters are not approved by the FDA.    Medications refilled    Claudine Corrigan DO  01/25/24

## 2024-02-01 ENCOUNTER — APPOINTMENT (OUTPATIENT)
Dept: PODIATRY | Facility: CLINIC | Age: 44
End: 2024-02-01
Payer: COMMERCIAL

## 2024-02-12 ENCOUNTER — OFFICE VISIT (OUTPATIENT)
Dept: PODIATRY | Facility: CLINIC | Age: 44
End: 2024-02-12
Payer: COMMERCIAL

## 2024-02-12 DIAGNOSIS — M77.9 CAPSULITIS: Primary | ICD-10-CM

## 2024-02-12 PROCEDURE — L3020 FOOT LONGITUD/METATARSAL SUP: HCPCS | Performed by: PODIATRIST

## 2024-02-12 PROCEDURE — 1036F TOBACCO NON-USER: CPT | Performed by: PODIATRIST

## 2024-02-12 NOTE — PROGRESS NOTES
CC: orthotics    HPI:  Patient seen follow up foot pain/callus improved, here to be casted for orthotics.    PCP: Dr. Corrigan  Last visit: 1-25-24     PMH  Past Medical History:   Diagnosis Date    ADHD (attention deficit hyperactivity disorder)     Depression, unspecified 12/08/2013    Depression    Irritable bowel syndrome without diarrhea 12/08/2013    Irritable bowel syndrome    Low back pain, unspecified 09/25/2015    Lumbago    Other conditions influencing health status 12/08/2013    Attention-deficit Hyperactivity Disorder    Panic disorder (episodic paroxysmal anxiety) 12/08/2013    Panic disorder without agoraphobia     MEDS    Current Outpatient Medications:     aspirin 500 mg buffered tablet, Take 1 tablet (500 mg) by mouth every 6 hours if needed for mild pain (1 - 3)., Disp: , Rfl:     atomoxetine (Strattera) 60 mg capsule, Take 1 capsule (60 mg) by mouth once daily., Disp: 90 capsule, Rfl: 3    tadalafil 20 mg tablet, Take 1 tablet (20 mg) by mouth once daily as needed for erectile dysfunction., Disp: 10 tablet, Rfl: 5  Allergies  No Known Allergies  Social History     Socioeconomic History    Marital status: Single     Spouse name: Not on file    Number of children: Not on file    Years of education: Not on file    Highest education level: Not on file   Occupational History    Not on file   Tobacco Use    Smoking status: Never    Smokeless tobacco: Never   Vaping Use    Vaping Use: Never used   Substance and Sexual Activity    Alcohol use: Not Currently     Comment: social    Drug use: Never    Sexual activity: Not on file   Other Topics Concern    Not on file   Social History Narrative    Not on file     Social Determinants of Health     Financial Resource Strain: Not on file   Food Insecurity: Not on file   Transportation Needs: Not on file   Physical Activity: Not on file   Stress: Not on file   Social Connections: Not on file   Intimate Partner Violence: Not on file   Housing Stability: Not on  file     Family History   Problem Relation Name Age of Onset    No Known Problems Mother      Other (brain tumor) Father      Skin cancer Father       Past Surgical History:   Procedure Laterality Date    CIRCUMCISION, PRIMARY      WISDOM TOOTH EXTRACTION         REVIEW OF SYSTEMS    + as noted above in HPI.       Physical examination:   On General Observation: Patient is a pleasant, cooperative, well developed 43 y.o.  adult male. The patient is alert and oriented to time, place and person. Patient has normal affect and mood.  There were no vitals taken for this visit.    Vascular:  DP and PT pulses are 2/4 b/l.  no edema noted. no varicosities b/l.  CFT  5 seconds to all digits bilateral.  Skin temperature is warm to warm from proximal to distal bilateral.      Muscular: Strength is 5/5 b/l.  Motor strength is normal and symmetric proximally, as well as distally, in all four extremities. Good mobility of all extremities.  Tenderness to feet.     Neuro:  Proprioception present.   Sensation to vibration is  present. Protective sensation present  at all pedal sites via [place] Chiki 5.07 monofilament bilateral.  Light touch intact bilateral.     Derm:  No rashes, lesions, or ecchymosis.     Ortho:  Cavus foot, pressure lesser mpj's      ASSESSMENT:    M77.9     PLAN:   Casted for orthotics custom biofoam sport with met pads call whena rrives    Chemo Dodson DPM

## 2024-03-12 ENCOUNTER — TELEPHONE (OUTPATIENT)
Dept: PRIMARY CARE | Facility: CLINIC | Age: 44
End: 2024-03-12
Payer: COMMERCIAL

## 2024-03-12 NOTE — TELEPHONE ENCOUNTER
Your orthotics are in.  Please schedule an appointment at your earliest convenience with Dr Dodson to get your new inserts.  You can call [phone] or schedule through [facility].   Thank you.

## 2024-09-04 DIAGNOSIS — N52.9 ERECTILE DYSFUNCTION, UNSPECIFIED ERECTILE DYSFUNCTION TYPE: ICD-10-CM

## 2024-09-04 RX ORDER — TADALAFIL 20 MG/1
20 TABLET ORAL DAILY PRN
Qty: 10 TABLET | Refills: 5 | Status: SHIPPED | OUTPATIENT
Start: 2024-09-04

## 2024-10-24 DIAGNOSIS — N52.9 ERECTILE DYSFUNCTION, UNSPECIFIED ERECTILE DYSFUNCTION TYPE: ICD-10-CM

## 2024-10-25 RX ORDER — TADALAFIL 20 MG/1
20 TABLET ORAL DAILY PRN
Qty: 10 TABLET | Refills: 5 | Status: SHIPPED | OUTPATIENT
Start: 2024-10-25

## 2025-01-29 ASSESSMENT — ENCOUNTER SYMPTOMS
HEMATURIA: 0
NERVOUS/ANXIOUS: 0
SHORTNESS OF BREATH: 0
FREQUENCY: 0
DYSURIA: 0
PALPITATIONS: 0
DIARRHEA: 0
COUGH: 0
FATIGUE: 0
DIZZINESS: 0
CHILLS: 0
DYSPHORIC MOOD: 0
NUMBNESS: 0
NAUSEA: 0
ABDOMINAL PAIN: 0
CONSTIPATION: 0
HEADACHES: 0
FEVER: 0
WHEEZING: 0

## 2025-01-29 NOTE — PROGRESS NOTES
"Asad Juan Jr.  presents for his annual wellness exam.    Specialists seen by patient:   -eye doctor  -dentist    Hx of colon ca screening: n/a  History of depression or anxiety:no  Immunizations: up to date  Diet: Follows a healthy diet  Exercise: Gets regular exercise, weights, walking, elliptical   Alcohol use:  not often    How many times in the past year 5 or more drinks in a day? 0  Lung cancer screening: not applicable  Smoking history: never a smoker  AAA Screening: not applicable  Drug use: no  PHQ-9:  HCPOA: yes    Referral to urology for hypogonadism. Recently bought testosterone pills from [employer].     Needs strattera refilled, been on it for 20 years. Works well for him. Takes it daily.    12 days ago, he fell on the ice and landed on his R hand. Still having pain. Mostly radial and toward his thumb. \"Middle of the hand\". It did swell, not a lot of bruising. Ice quickly. R handed. Using it makes it worse. Taking off water bottle lids, pushing up off the ground. Sometimes a mild throbbing. No previous injury.       Review of Systems   Constitutional:  Negative for chills, fatigue and fever.   Respiratory:  Negative for cough, shortness of breath and wheezing.    Cardiovascular:  Negative for chest pain, palpitations and leg swelling.   Gastrointestinal:  Negative for abdominal pain, constipation, diarrhea and nausea.   Genitourinary:  Negative for dysuria, frequency, hematuria and urgency.   Neurological:  Negative for dizziness, numbness and headaches.   Psychiatric/Behavioral:  Negative for dysphoric mood. The patient is not nervous/anxious.           Objective    /84   Wt 88 kg (194 lb)   BMI 23.93 kg/m²     Physical Exam  Constitutional:       General: He is not in acute distress.     Appearance: Normal appearance.   HENT:      Head: Normocephalic and atraumatic.      Right Ear: Tympanic membrane and ear canal normal.      Left Ear: Tympanic membrane and ear canal normal.      " Mouth/Throat:      Mouth: Mucous membranes are moist.      Pharynx: No posterior oropharyngeal erythema.   Eyes:      Extraocular Movements: Extraocular movements intact.      Pupils: Pupils are equal, round, and reactive to light.   Cardiovascular:      Rate and Rhythm: Normal rate and regular rhythm.      Heart sounds: No murmur heard.  Pulmonary:      Effort: Pulmonary effort is normal. No respiratory distress.      Breath sounds: Normal breath sounds. No wheezing.   Abdominal:      General: Bowel sounds are normal.      Palpations: Abdomen is soft.      Tenderness: There is no abdominal tenderness. There is no guarding.   Musculoskeletal:         General: Normal range of motion.      Cervical back: Neck supple.   Skin:     General: Skin is warm and dry.   Neurological:      General: No focal deficit present.      Mental Status: He is alert and oriented to person, place, and time.   Psychiatric:         Mood and Affect: Mood normal.         Behavior: Behavior normal.      Hand/Wrist Musculoskeletal Exam    Inspection    Right      Right hand/wrist inspection is normal.    Left      Left hand/wrist inspection is normal.    Palpation    Right      Right hand palpation is normal.      Wrist tenderness to palpation: radial carpal joint and ulnocarpal joint    Left       Left hand palpation is normal.      Left wrist palpation is normal.    Range of Motion    Right Hand      Right hand range of motion is normal.      Left Hand      Left hand range of motion is normal.       Right Wrist      Right wrist range of motion is normal.      Left Wrist      Left wrist range of motion is normal.      Strength    Right Hand      Right hand strength is normal.      Left Hand      Left hand strength is normal.      Right Wrist      Right wrist strength is normal.      Left Wrist      Left wrist strength is normal.      Neurovascular    Right       Right neurovascular exam is normal.    Left       Left neurovascular exam is  normal.    General    Neurological: alert         Problem List Items Addressed This Visit       ADD (attention deficit disorder) without hyperactivity    Relevant Medications    atomoxetine (Strattera) 60 mg capsule    Erectile dysfunction    Relevant Medications    tadalafil 20 mg tablet    Other Relevant Orders    Testosterone, total and free    Referral to Urology     Other Visit Diagnoses       Health maintenance examination    -  Primary    Relevant Orders    Lipid Panel    CBC    Comprehensive Metabolic Panel    Hypogonadism in male        Relevant Orders    Testosterone, total and free    Referral to Urology    Right wrist pain        Relevant Orders    XR wrist right 3+ views               We will obtain fasting blood work.  Results will be communicated to the patient via MyChart or a letter.   We reviewed appropriate preventative health screening guidelines. We discussed regular aerobic exercise. We discussed proper nutrition and weight control.  He has been taking an over-the-counter testosterone supplement.  I did not find any good data about how long he needs to be off of that prior to getting his testosterone checked.  Given the mechanism of testosterone supplementation, I have recommended that he check blood work in about 6 to 8 weeks but that may still not be adequate enough.  I have also placed a referral to urology.    Will have him check wrist x-rays.  We did discuss a cock up wrist splint as well.      5-10 minutes were spent screening for depression using PHQ-2/PHQ-9 as documented in the chart      Claudine Corrigan, DO  01/30/25

## 2025-01-30 ENCOUNTER — APPOINTMENT (OUTPATIENT)
Dept: PRIMARY CARE | Facility: CLINIC | Age: 45
End: 2025-01-30
Payer: COMMERCIAL

## 2025-01-30 VITALS — DIASTOLIC BLOOD PRESSURE: 84 MMHG | BODY MASS INDEX: 23.93 KG/M2 | WEIGHT: 194 LBS | SYSTOLIC BLOOD PRESSURE: 128 MMHG

## 2025-01-30 DIAGNOSIS — M25.531 RIGHT WRIST PAIN: ICD-10-CM

## 2025-01-30 DIAGNOSIS — E29.1 HYPOGONADISM IN MALE: ICD-10-CM

## 2025-01-30 DIAGNOSIS — N52.9 ERECTILE DYSFUNCTION, UNSPECIFIED ERECTILE DYSFUNCTION TYPE: ICD-10-CM

## 2025-01-30 DIAGNOSIS — Z00.00 HEALTH MAINTENANCE EXAMINATION: Primary | ICD-10-CM

## 2025-01-30 DIAGNOSIS — F98.8 ADD (ATTENTION DEFICIT DISORDER) WITHOUT HYPERACTIVITY: ICD-10-CM

## 2025-01-30 PROCEDURE — 99396 PREV VISIT EST AGE 40-64: CPT | Performed by: STUDENT IN AN ORGANIZED HEALTH CARE EDUCATION/TRAINING PROGRAM

## 2025-01-30 PROCEDURE — 1036F TOBACCO NON-USER: CPT | Performed by: STUDENT IN AN ORGANIZED HEALTH CARE EDUCATION/TRAINING PROGRAM

## 2025-01-30 PROCEDURE — 99214 OFFICE O/P EST MOD 30 MIN: CPT | Performed by: STUDENT IN AN ORGANIZED HEALTH CARE EDUCATION/TRAINING PROGRAM

## 2025-01-30 RX ORDER — ATOMOXETINE 60 MG/1
60 CAPSULE ORAL DAILY
Qty: 90 CAPSULE | Refills: 3 | Status: SHIPPED | OUTPATIENT
Start: 2025-01-30 | End: 2026-01-30

## 2025-01-30 RX ORDER — TADALAFIL 20 MG/1
20 TABLET ORAL DAILY PRN
Qty: 10 TABLET | Refills: 5 | Status: SHIPPED | OUTPATIENT
Start: 2025-01-30

## 2025-01-30 ASSESSMENT — PATIENT HEALTH QUESTIONNAIRE - PHQ9
1. LITTLE INTEREST OR PLEASURE IN DOING THINGS: NOT AT ALL
1. LITTLE INTEREST OR PLEASURE IN DOING THINGS: NOT AT ALL
2. FEELING DOWN, DEPRESSED OR HOPELESS: NOT AT ALL
2. FEELING DOWN, DEPRESSED OR HOPELESS: NOT AT ALL
SUM OF ALL RESPONSES TO PHQ9 QUESTIONS 1 AND 2: 0
SUM OF ALL RESPONSES TO PHQ9 QUESTIONS 1 AND 2: 0

## 2025-01-31 ENCOUNTER — HOSPITAL ENCOUNTER (OUTPATIENT)
Dept: RADIOLOGY | Facility: CLINIC | Age: 45
Discharge: HOME | End: 2025-01-31
Payer: COMMERCIAL

## 2025-01-31 DIAGNOSIS — M25.531 RIGHT WRIST PAIN: ICD-10-CM

## 2025-01-31 PROCEDURE — 73110 X-RAY EXAM OF WRIST: CPT | Mod: RT

## 2025-02-07 ENCOUNTER — TELEPHONE (OUTPATIENT)
Dept: PRIMARY CARE | Facility: CLINIC | Age: 45
End: 2025-02-07
Payer: COMMERCIAL

## 2025-02-07 ENCOUNTER — OFFICE VISIT (OUTPATIENT)
Dept: URGENT CARE | Age: 45
End: 2025-02-07
Payer: COMMERCIAL

## 2025-02-07 DIAGNOSIS — G47.00 INSOMNIA, UNSPECIFIED TYPE: Primary | ICD-10-CM

## 2025-02-07 DIAGNOSIS — F51.04 PSYCHOPHYSIOLOGICAL INSOMNIA: Primary | ICD-10-CM

## 2025-02-07 RX ORDER — HYDROXYZINE HYDROCHLORIDE 10 MG/1
10-20 TABLET, FILM COATED ORAL NIGHTLY PRN
Qty: 30 TABLET | Refills: 0 | Status: SHIPPED | OUTPATIENT
Start: 2025-02-07 | End: 2025-03-09

## 2025-02-07 NOTE — TELEPHONE ENCOUNTER
Pt called to state that he has not sleep more than 5 hours in the last 3 days, per pt was having a problem with sleep that started 2 months ago, but since he went off the testosterone medication he was getting at [employer] he is now not able to sleep at all.    Would like something called in, explained that a sleep medication was a control medication and that by law you can not call something like that into the pharmacy.  Pt is asking for something or we will not be able to sleep at all this weekend.    Pt is asking for an appointment with you on Monday.    [facility] [place] [phone]  [facility]    Please review and advise      PT HAS CALLED BACK TO STATE HE HAS BEEN AWAKE NOW  HOURS AND IS REQUESTING SOME SORT OF A VISIT.    PLEASE REVIEW AND ADVISE

## 2025-02-07 NOTE — PROGRESS NOTES
Subjective   Patient ID: Asad Juan Jr. is a 44 y.o. male. They present today with a chief complaint of No chief complaint on file..    History of Present Illness  44-year-old male presents with chronic insomnia.  He consulted with with his primary care provider and had blood work done.  The results showed he had low testosterone and he had plans to follow-up with his urologist.  However, he stated that his insomnia has been so severe that he only slept 6 hours during the last 4 days. He requested for a sleep aid.  ll basic treatment such as Benadryl did not improve the symptoms. He was seen at [facility], and 2 other urgent care clinics but he was turned down and referred back to his primary care provider for treatment of the chronic insomnia. His PCP ordered CMP, CBC, testosterone and lipid panel on 1/30/2025, but results are not available in EPIC. This discussed that he might need a sleep study to find the root cause of the problem especially low testosterone, so appropriate treatment could be provided. This provider apologized to the patient and advised that a sleep aid could be habit forming and did not treat the root cause of the problem. He would need to follow up with his PCP.      History provided by:  Patient   used: No        Past Medical History  Allergies as of 02/07/2025    (No Known Allergies)       (Not in a hospital admission)       Past Medical History:   Diagnosis Date    ADHD (attention deficit hyperactivity disorder)     Depression, unspecified 12/08/2013    Depression    Irritable bowel syndrome without diarrhea 12/08/2013    Irritable bowel syndrome    Low back pain, unspecified 09/25/2015    Lumbago    Other conditions influencing health status 12/08/2013    Attention-deficit Hyperactivity Disorder    Panic disorder (episodic paroxysmal anxiety) 12/08/2013    Panic disorder without agoraphobia       Past Surgical History:   Procedure Laterality Date     CIRCUMCISION, PRIMARY      WISDOM TOOTH EXTRACTION          reports that he has never smoked. He has never been exposed to tobacco smoke. He has never used smokeless tobacco. He reports that he does not currently use alcohol. He reports that he does not use drugs.    Review of Systems  Review of Systems                               Objective    There were no vitals filed for this visit.  No LMP for male patient.    Physical Exam    Procedures    Point of Care Test & Imaging Results from this visit  No results found for this visit on 02/07/25.   No results found.    Diagnostic study results (if any) were reviewed by MARTÍN Nation.    Assessment/Plan   Allergies, medications, history, and pertinent labs/EKGs/Imaging reviewed by MARTÍN Nation.     Medical Decision Making      Orders and Diagnoses  There are no diagnoses linked to this encounter.    Medical Admin Record      Patient disposition: Home    Electronically signed by MARTÍN Nation  2:48 PM

## 2025-02-12 ENCOUNTER — OFFICE VISIT (OUTPATIENT)
Dept: PRIMARY CARE | Facility: CLINIC | Age: 45
End: 2025-02-12
Payer: COMMERCIAL

## 2025-02-12 VITALS
OXYGEN SATURATION: 98 % | DIASTOLIC BLOOD PRESSURE: 86 MMHG | SYSTOLIC BLOOD PRESSURE: 130 MMHG | WEIGHT: 190.2 LBS | BODY MASS INDEX: 23.46 KG/M2 | TEMPERATURE: 97.6 F | HEART RATE: 114 BPM

## 2025-02-12 DIAGNOSIS — E29.1 HYPOGONADISM IN MALE: ICD-10-CM

## 2025-02-12 DIAGNOSIS — R68.82 DECREASED LIBIDO: ICD-10-CM

## 2025-02-12 DIAGNOSIS — N62 GYNECOMASTIA: ICD-10-CM

## 2025-02-12 DIAGNOSIS — F51.04 PSYCHOPHYSIOLOGICAL INSOMNIA: ICD-10-CM

## 2025-02-12 DIAGNOSIS — N52.9 ERECTILE DYSFUNCTION, UNSPECIFIED ERECTILE DYSFUNCTION TYPE: Primary | ICD-10-CM

## 2025-02-12 PROCEDURE — 99214 OFFICE O/P EST MOD 30 MIN: CPT | Performed by: STUDENT IN AN ORGANIZED HEALTH CARE EDUCATION/TRAINING PROGRAM

## 2025-02-12 PROCEDURE — 1036F TOBACCO NON-USER: CPT | Performed by: STUDENT IN AN ORGANIZED HEALTH CARE EDUCATION/TRAINING PROGRAM

## 2025-02-12 RX ORDER — LORAZEPAM 1 MG/1
1 TABLET ORAL EVERY 8 HOURS PRN
COMMUNITY
Start: 2025-02-10 | End: 2025-02-20

## 2025-02-12 RX ORDER — HYDROXYZINE HYDROCHLORIDE 25 MG/1
25-50 TABLET, FILM COATED ORAL NIGHTLY PRN
Qty: 60 TABLET | Refills: 2 | Status: SHIPPED | OUTPATIENT
Start: 2025-02-12

## 2025-02-12 ASSESSMENT — ANXIETY QUESTIONNAIRES
2. NOT BEING ABLE TO STOP OR CONTROL WORRYING: NOT AT ALL
6. BECOMING EASILY ANNOYED OR IRRITABLE: NOT AT ALL
1. FEELING NERVOUS, ANXIOUS, OR ON EDGE: SEVERAL DAYS
7. FEELING AFRAID AS IF SOMETHING AWFUL MIGHT HAPPEN: SEVERAL DAYS
IF YOU CHECKED OFF ANY PROBLEMS ON THIS QUESTIONNAIRE, HOW DIFFICULT HAVE THESE PROBLEMS MADE IT FOR YOU TO DO YOUR WORK, TAKE CARE OF THINGS AT HOME, OR GET ALONG WITH OTHER PEOPLE: SOMEWHAT DIFFICULT
GAD7 TOTAL SCORE: 3
3. WORRYING TOO MUCH ABOUT DIFFERENT THINGS: SEVERAL DAYS
5. BEING SO RESTLESS THAT IT IS HARD TO SIT STILL: NOT AT ALL
4. TROUBLE RELAXING: NOT AT ALL

## 2025-02-12 ASSESSMENT — PATIENT HEALTH QUESTIONNAIRE - PHQ9
1. LITTLE INTEREST OR PLEASURE IN DOING THINGS: NOT AT ALL
10. IF YOU CHECKED OFF ANY PROBLEMS, HOW DIFFICULT HAVE THESE PROBLEMS MADE IT FOR YOU TO DO YOUR WORK, TAKE CARE OF THINGS AT HOME, OR GET ALONG WITH OTHER PEOPLE: VERY DIFFICULT
4. FEELING TIRED OR HAVING LITTLE ENERGY: NEARLY EVERY DAY
6. FEELING BAD ABOUT YOURSELF - OR THAT YOU ARE A FAILURE OR HAVE LET YOURSELF OR YOUR FAMILY DOWN: SEVERAL DAYS
3. TROUBLE FALLING OR STAYING ASLEEP OR SLEEPING TOO MUCH: NEARLY EVERY DAY
SUM OF ALL RESPONSES TO PHQ9 QUESTIONS 1 AND 2: 1
2. FEELING DOWN, DEPRESSED OR HOPELESS: SEVERAL DAYS
7. TROUBLE CONCENTRATING ON THINGS, SUCH AS READING THE NEWSPAPER OR WATCHING TELEVISION: SEVERAL DAYS

## 2025-02-12 ASSESSMENT — ENCOUNTER SYMPTOMS
OCCASIONAL FEELINGS OF UNSTEADINESS: 0
NUMBNESS: 0
FATIGUE: 0
CHILLS: 0
DYSURIA: 0
HEADACHES: 0
SHORTNESS OF BREATH: 0
COUGH: 0
DIARRHEA: 0
FREQUENCY: 0
DIZZINESS: 0
WHEEZING: 0
FEVER: 0
NAUSEA: 0
DEPRESSION: 1
CONSTIPATION: 0
NERVOUS/ANXIOUS: 1
HEMATURIA: 0
DYSPHORIC MOOD: 0
ABDOMINAL PAIN: 0
PALPITATIONS: 0

## 2025-02-12 NOTE — PROGRESS NOTES
"Subjective   Patient ID: Asad Juan Jr. is a 44 y.o. male who presents for Follow-up (ER VISIT [facility], RECORDS IN CHART).    HPI   Since he saw me 2 weeks ago, he went 120 hours without sleep.  He stopped his OTC testosterone supplement at that time.  Prior to that, starting about 2 months ago, is when he really started having problems sleeping.  In college, 1 night, he lost sensation to his penis. He could still get an erection and orgasm but it wasn't pleasurable.  That persisted after that.  Cialis helped a little bit but didn't fix it. He learned to live with it.   He had other symptoms that started at the same time. Had severe depression that started at the same time his senior year of college. He had a sense of shame inside. He felt uncomfortable with himself. He didn't enjoy things but was still social and did well in school. He stopped exercising and lost a lot of weight. He went home for 2 months and gained 60 lbs in 2 months. 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 He was better then b/c he was so busy.  He then injured his back and that caused a lot of issues with pain.  After that, he started isolating himself, but to others he was very successful and doing well.    3rd year of law school he got a little better. Late 20s he got depressed again and gained 60 lbs in 2 months. He then became obsessed with working out, his weight, food, and how he looked. Lost a lot of weight but his body was never the same. He couldn't gain muscle and had gynecomastia.  Throughout all of this, he was still having his sexual issues.  At 40, he started taking pills from [employer], maybe \"max prime\". His symptoms improved. Sexually it didn't fix anything but it improved. He had more libido.  At the gym he had more energy and could gain muscle. He started losing body fat. 3 months ago, he felt great again. Felt good with his body.  Confident.  2 months ago, he felt worse.  He started not being able to feel his " penis again.  He had always had less sensation but starting then it went back to how it was in college.  Lost confience again. He hadn't been able to concentrate. Was reading 3-4 books a month and now has not read a book.  Once the sexual dysfunction started he started getting insomnia.  It started that he would fall asleep and woke up and couldn't go back to sleep.   He stopped taking his OTC testosterone pills 2 weeks ago. Sexually, he has no libido and physically he has no reaction.  Mentally he still has interest and wants to have sex but he is not having any reactions.  The first 4 nights after he stopped the pills, he was getting 90 minutes of sleep. Since then he hasn't really slept much at all. Has tried melatonin, nyquil, benadryl, hydroxyzine and nothing helped.  Went to the ED on Friday. Blood work negative. They gave him 1 mg of ativan, it made him feel calmer but didn't put him to sleep. They gave him 30 pills of ativan. He did fall asleep 2 nights since then.   He does not feel that this is a mental issue as much as he feels that it is a testosterone or sex hormone issue.    With the sleep issue, he wanted to sleep and tried to sleep but was unable to.  No signs or symptoms of yannick.    Review of Systems   Constitutional:  Negative for chills, fatigue and fever.   Respiratory:  Negative for cough, shortness of breath and wheezing.    Cardiovascular:  Negative for chest pain, palpitations and leg swelling.   Gastrointestinal:  Negative for abdominal pain, constipation, diarrhea and nausea.   Genitourinary:  Negative for dysuria, frequency, hematuria and urgency.   Neurological:  Negative for dizziness, numbness and headaches.   Psychiatric/Behavioral:  Negative for dysphoric mood. The patient is nervous/anxious.        Objective   /86 (BP Location: Left arm, Patient Position: Sitting, BP Cuff Size: Adult)   Pulse (!) 114   Temp 36.4 °C (97.6 °F) (Temporal)   Wt 86.3 kg (190 lb 3.2 oz)   SpO2  98%   BMI 23.46 kg/m²     Physical Exam  Vitals reviewed.   Constitutional:       Appearance: Normal appearance.   HENT:      Head: Normocephalic and atraumatic.   Eyes:      Extraocular Movements: Extraocular movements intact.      Pupils: Pupils are equal, round, and reactive to light.   Pulmonary:      Effort: Pulmonary effort is normal.   Skin:     General: Skin is warm and dry.   Neurological:      General: No focal deficit present.      Mental Status: He is alert.   Psychiatric:         Attention and Perception: Attention normal.         Mood and Affect: Mood normal.         Speech: Speech normal.         Behavior: Behavior normal.         Thought Content: Thought content normal.         Cognition and Memory: Cognition and memory normal.         Assessment/Plan   Problem List Items Addressed This Visit       Erectile dysfunction - Primary     Other Visit Diagnoses       Decreased libido        Gynecomastia        Psychophysiological insomnia        Relevant Medications    hydrOXYzine HCL (Atarax) 25 mg tablet    Hypogonadism in male        Relevant Orders    Referral to Endocrinology          Patient with complex issues right now.  Most likely multifactorial in nature.  At this point in time, I do feel it is important that he sleeps and so I agree with continuing the lorazepam for right now and I am increasing the dose of his hydroxyzine as needed.  He is currently using [employer]umm[employer] so I will see him back in about 3 weeks for follow-up on this and possible refill of lorazepam.  He knows that this is a short term solution until we are able to find out more information about what is going on.  I have placed a referral to endocrinology for further evaluation and he already has a referral to urology in the chart.  He is going to stay off of his over-the-counter testosterone supplement in the meantime.  He does feel better now that he has been sleeping.    Claudine Corrigan, DO  2/12/2025

## 2025-02-14 ENCOUNTER — TELEPHONE (OUTPATIENT)
Dept: PRIMARY CARE | Facility: CLINIC | Age: 45
End: 2025-02-14
Payer: COMMERCIAL

## 2025-02-14 NOTE — TELEPHONE ENCOUNTER
" Pt calling to let you know he got an apt w/ Dr. WATSON Monday 2/17/2025 and an apt w/ Dr. Pérez\"s (spelling?) Endocrinology Friday 2/21/2025.  "

## 2025-02-17 ENCOUNTER — OFFICE VISIT (OUTPATIENT)
Dept: UROLOGY | Facility: HOSPITAL | Age: 45
End: 2025-02-17
Payer: COMMERCIAL

## 2025-02-17 DIAGNOSIS — N52.8 OTHER MALE ERECTILE DYSFUNCTION: ICD-10-CM

## 2025-02-17 DIAGNOSIS — Z12.5 PROSTATE CANCER SCREENING: ICD-10-CM

## 2025-02-17 DIAGNOSIS — R79.89 LOW TESTOSTERONE: ICD-10-CM

## 2025-02-17 DIAGNOSIS — N52.9 ERECTILE DYSFUNCTION, UNSPECIFIED ERECTILE DYSFUNCTION TYPE: Primary | ICD-10-CM

## 2025-02-17 PROCEDURE — 99214 OFFICE O/P EST MOD 30 MIN: CPT | Performed by: UROLOGY

## 2025-02-17 PROCEDURE — 99204 OFFICE O/P NEW MOD 45 MIN: CPT | Performed by: UROLOGY

## 2025-02-17 RX ORDER — TADALAFIL 5 MG/1
5 TABLET ORAL DAILY
Qty: 30 TABLET | Refills: 11 | Status: SHIPPED | OUTPATIENT
Start: 2025-02-17

## 2025-02-17 RX ORDER — TADALAFIL 20 MG/1
20 TABLET ORAL AS NEEDED
Qty: 30 TABLET | Refills: 6 | Status: SHIPPED | OUTPATIENT
Start: 2025-02-17

## 2025-02-17 NOTE — PROGRESS NOTES
"HPI:  43 yo M presenting for ED. Has lost sensation to his penis since his 20s. Cialis was prescribed, but still had no sensation, just increased strength of erections. Also physically lacked libido since then but \"logically still wanted sex\". Symptoms remained stable, but has had fluctuating weights. Also endorses gynecomastia since around age 29. Started taking test booster which increased energy but didn't help with penile sensation. Symptoms recurred two months ago with complete loss of penile sensation and complete loss of erections, difficulty concentrating, and insomnia. Stopped taking test booster and all symptoms got worse. No erections for about three weeks, no issues with ejaculation; no penile curvature. No urinary symptoms.     PMH: ADHD on strattera; low back pain, receives epidural injections  PSHx: denied  NKDA  SH: never tobacco user; drinks on holidays; no recreational drug use  FHx: non-contributory       HPI:  44 y.o. yo male patient complains of  erectile dysfunction    Duration:   Rigidity of erections:  \"barely able to get erections\"/10  Morning Erections: no  s/p prostatectomy: no  On nitrates/NTG?: No  Smoker?   Partner:   Tried PDE5is?:   Viagra/sildenafil - response:   Cialis/tadalafil- response: tried 20mg but feels no sensation in penis  Tried penile injections: no  Libido/Desire: Good  Have been on Testosterone /anabolic steroids? No  Pain or curvature in penis when erect: None  Premature or delayed ejaculation:  None    No results found for: \"TESTOSTERONE\"  Component      Latest Ref [facility] 11/22/2023   Testosterone, Free      35.0 - 155.0 pg/mL 59.5    Testosterone, Total, LC-MS/MS      250 - 1100 ng/dL 344        No results found for: \"LH\"  No results found for: \"FSH\"  No components found for: \"ESTRADIAL\"  No results found for: \"PSA\"  No components found for: \"CBC\"  No results found for: \"PROLACTIN\"  No results found for: \"HGBA1C\"      PMH:  Past Medical History:   Diagnosis Date    ADHD " (attention deficit hyperactivity disorder)     Depression, unspecified 12/08/2013    Depression    Irritable bowel syndrome without diarrhea 12/08/2013    Irritable bowel syndrome    Low back pain, unspecified 09/25/2015    Lumbago    Other conditions influencing health status 12/08/2013    Attention-deficit Hyperactivity Disorder    Panic disorder (episodic paroxysmal anxiety) 12/08/2013    Panic disorder without agoraphobia        PSH:  Past Surgical History:   Procedure Laterality Date    CIRCUMCISION, PRIMARY      WISDOM TOOTH EXTRACTION          Medications:    Current Outpatient Medications:     aspirin 500 mg buffered tablet, Take 1 tablet (500 mg) by mouth every 6 hours if needed for mild pain (1 - 3)., Disp: , Rfl:     atomoxetine (Strattera) 60 mg capsule, Take 1 capsule (60 mg) by mouth once daily., Disp: 90 capsule, Rfl: 3    hydrOXYzine HCL (Atarax) 25 mg tablet, Take 1-2 tablets (25-50 mg) by mouth as needed at bedtime for anxiety (Sleep)., Disp: 60 tablet, Rfl: 2    LORazepam (Ativan) 1 mg tablet, Take 1 tablet (1 mg) by mouth every 8 hours if needed., Disp: , Rfl:     tadalafil (Cialis) 20 mg tablet, Take 1 tablet (20 mg) by mouth if needed for erectile dysfunction (Start with half tab. Take 2 hours prior to wanting erection.If you get an erection over 4 hours, go to the emergency room.)., Disp: 30 tablet, Rfl: 6    tadalafil (Cialis) 5 mg tablet, Take 1 tablet (5 mg) by mouth once daily., Disp: 30 tablet, Rfl: 11    Allergy:  No Known Allergies     Exam  Testicles descended bilaterally, nontender, no masses  Vasa palpable bilaterally  Penis circ'd, no lesions, no plaques      Assessment/Plan  #Erectile Dysfunction:    ED panel  Trial of 5mg daily+Cialis 20mg prn prior to sexual activity - medication counseling done. Can start with half tab. Discussed side effects including priapism, headaches, stuffiness, and back pain. Discussed that if he gets an erection >4 hours, he needs to present to the  emergency room or risk worsening of his erectile dysfunction long term.   Referral to Dr. Bae, sex therapist/counselor    #concern for low testosterone  I reviewed the common causes of hypogonadism with the patient. We will obtain a fasting, morning hypogonadal panel to determine if his hormonal axis is abnormal. We will evaluate his lab results and if needed, at that point we will begin treatment.     We discussed different modalities to treat hypogonadism, including hcg, anastrozole, clomiphene, testosterone gels/creams, nasal testosterone, testosterone pellets, and testosterone injections. I also reviewed with him common risks following testosterone replacement, including cholesterol imbalance, polycythemia, cancer progression and infertility.   We reviewed with him with our office's consent form.     -the patient's testosterone was normal  on most recent check, but we we will recheck T in the morning  -even if T is low, do not think this is the cause of his insomnia and systemic symptoms. Poor sleep quality can lead to low T, and I emphasized that addressing the primary issue could normalize T even if low.    #penile numbness  -has sensation on exam today  -consider neurology referral in future    Fu in 2-3 months    Scribe Attestation  By signing my name below, I, Yissel Hermosillo , Scribe   attest that this documentation has been prepared under the direction and in the presence of Bisi Armstrong MD.

## 2025-02-20 ENCOUNTER — APPOINTMENT (OUTPATIENT)
Dept: ENDOCRINOLOGY | Facility: CLINIC | Age: 45
End: 2025-02-20
Payer: COMMERCIAL

## 2025-02-21 ENCOUNTER — APPOINTMENT (OUTPATIENT)
Dept: ENDOCRINOLOGY | Facility: CLINIC | Age: 45
End: 2025-02-21
Payer: COMMERCIAL

## 2025-02-21 VITALS
SYSTOLIC BLOOD PRESSURE: 118 MMHG | HEIGHT: 75 IN | DIASTOLIC BLOOD PRESSURE: 78 MMHG | HEART RATE: 90 BPM | WEIGHT: 198 LBS | BODY MASS INDEX: 24.62 KG/M2

## 2025-02-21 DIAGNOSIS — N62 GYNECOMASTIA: Primary | ICD-10-CM

## 2025-02-21 DIAGNOSIS — G47.00 INSOMNIA, UNSPECIFIED TYPE: ICD-10-CM

## 2025-02-21 DIAGNOSIS — N52.9 ERECTILE DYSFUNCTION, UNSPECIFIED ERECTILE DYSFUNCTION TYPE: ICD-10-CM

## 2025-02-21 PROCEDURE — 3008F BODY MASS INDEX DOCD: CPT | Performed by: STUDENT IN AN ORGANIZED HEALTH CARE EDUCATION/TRAINING PROGRAM

## 2025-02-21 PROCEDURE — 99204 OFFICE O/P NEW MOD 45 MIN: CPT | Performed by: STUDENT IN AN ORGANIZED HEALTH CARE EDUCATION/TRAINING PROGRAM

## 2025-02-21 ASSESSMENT — PAIN SCALES - GENERAL: PAINLEVEL_OUTOF10: 0-NO PAIN

## 2025-02-21 NOTE — PATIENT INSTRUCTIONS
Get your labs done around 8-9am     Mammogram routinely to determine breast vs fat tissue     Follow up based on initial testing    Any Fraire MD  Divison of Endocrinology   [facility]   Phone: [phone]    option 4, then option 1  Fax: [phone]

## 2025-02-21 NOTE — PROGRESS NOTES
44 M PMH: ADD    Coming in today for hormone evaluation, referred by PCP     Seen Urology for ED as well    Back in his 20s had symptoms that was described as losing sensation in his penis, but no other parts of his body  Along with low libido   Bouts of depression   Had gain weight about 50lbs around that time noticed chest enlargement despite losing the weight subsequently      Symptoms recurred and then worsened in the last 3-6 months     Notes chest enlargement   Same sexual symptoms   Low libido   Depression   Trouble concentration   New symptoms include Insomnia -ended up in the ED recently due to being awake for at least 120 hrs     No noticeable abnormal pubertal changes  Have not tried conceiving in the past  Concussion in his 20s  No testicular trauma        No morning erections   No hot flashes or night sweats   No nipple discharte  ROS reviewed and is negative except for pertinent findings noted on HPI      Confounding meds:  Hydroxyzine  Ativan   strattera    Supplements:  Turmeric   Testosterone booster-last year then stopped around Jan 30    Past Medical History:   Diagnosis Date    ADHD (attention deficit hyperactivity disorder)     Depression, unspecified 12/08/2013    Depression    Irritable bowel syndrome without diarrhea 12/08/2013    Irritable bowel syndrome    Low back pain, unspecified 09/25/2015    Lumbago    Other conditions influencing health status 12/08/2013    Attention-deficit Hyperactivity Disorder    Panic disorder (episodic paroxysmal anxiety) 12/08/2013    Panic disorder without agoraphobia     Family History   Problem Relation Name Age of Onset    No Known Problems Mother      Other (brain tumor) Father      Skin cancer Father       Social History     Socioeconomic History    Marital status: Single     Spouse name: Not on file    Number of children: Not on file    Years of education: Not on file    Highest education level: Not on file   Occupational History    Not on file   Tobacco  Use    Smoking status: Never     Passive exposure: Never    Smokeless tobacco: Never   Vaping Use    Vaping status: Never Used   Substance and Sexual Activity    Alcohol use: Not Currently     Comment: social    Drug use: Never    Sexual activity: Not on file   Other Topics Concern    Not on file   Social History Narrative    Not on file     Social Drivers of Health     Financial Resource Strain: Not on file   Food Insecurity: Not on file   Transportation Needs: Not on file   Physical Activity: Not on file   Stress: Not on file   Social Connections: Not on file   Intimate Partner Violence: Not on file   Housing Stability: Not on file     ROS reviewed and is negative except for pertinent findings noted on HPI    Physical Exam  Constitutional:       Appearance: Normal appearance.      Comments: Virilized appearing male    Cardiovascular:      Rate and Rhythm: Normal rate and regular rhythm.   Chest:      Comments: No nipple discharge fatty tissue on the chest, unable to palpate glandular tissue   Abdominal:      Palpations: Abdomen is soft.   Skin:     General: Skin is warm.      Comments: Normal hair distribution, no acne   Neurological:      General: No focal deficit present.      Mental Status: He is alert and oriented to person, place, and time.         labs and imaging reviewed, pertinent findings listed on HPI and Impression    Problem List Items Addressed This Visit       Erectile dysfunction    Relevant Orders    DHEA-Sulfate    FSH & LH    Estradiol     Other Visit Diagnoses       Gynecomastia    -  Primary    Relevant Orders    Prolactin    DHEA-Sulfate    FSH & LH    Estradiol    BI mammo bilateral screening tomosynthesis    Insomnia, unspecified type        Relevant Orders    Chromogranin A    Metanephrines Plasma            There is no unifying Endocrine diagnosis to attribute a cause for his constellation of symptoms.  Even if he did have an organic cause of hypogonadism this does not explain his neuro  symptoms and this does not explain insomnia.  He also does not have any classic symptoms of hypogonadism    He could very well have relative hypogonadism just due to the sleep issues at this time     -we are checking his adrenal axis but did advise to stay off the supplements, also discussed need for alternative work up as this is not likely an Endocrine issue    As for his chest symptoms.  On exam, it is mostly fatty tissue, suspect pseudogynecomastia  -ordered for routine mammogram    Follow up as needed

## 2025-03-02 LAB
ALBUMIN SERPL-MCNC: 4.8 G/DL (ref 3.6–5.1)
ALP SERPL-CCNC: 38 U/L (ref 36–130)
ALT SERPL-CCNC: 27 U/L (ref 9–46)
ANION GAP SERPL CALCULATED.4IONS-SCNC: 10 MMOL/L (CALC) (ref 7–17)
AST SERPL-CCNC: 21 U/L (ref 10–40)
BILIRUB SERPL-MCNC: 0.7 MG/DL (ref 0.2–1.2)
BUN SERPL-MCNC: 27 MG/DL (ref 7–25)
CALCIUM SERPL-MCNC: 9.7 MG/DL (ref 8.6–10.3)
CGA SERPL-MCNC: 165 NG/ML
CHLORIDE SERPL-SCNC: 102 MMOL/L (ref 98–110)
CHOLEST SERPL-MCNC: 233 MG/DL
CHOLEST/HDLC SERPL: 3.6 (CALC)
CO2 SERPL-SCNC: 29 MMOL/L (ref 20–32)
CREAT SERPL-MCNC: 1.11 MG/DL (ref 0.6–1.29)
DHEA-S SERPL-MCNC: 86 MCG/DL (ref 61–442)
EGFRCR SERPLBLD CKD-EPI 2021: 84 ML/MIN/1.73M2
ERYTHROCYTE [DISTWIDTH] IN BLOOD BY AUTOMATED COUNT: 12.4 % (ref 11–15)
ESTRADIOL SERPL-MCNC: 23 PG/ML
FSH SERPL-ACNC: 6.9 MIU/ML (ref 1.4–12.8)
GLUCOSE SERPL-MCNC: 97 MG/DL (ref 65–99)
HCT VFR BLD AUTO: 47.1 % (ref 38.5–50)
HDLC SERPL-MCNC: 64 MG/DL
HGB BLD-MCNC: 15.1 G/DL (ref 13.2–17.1)
LDLC SERPL CALC-MCNC: 152 MG/DL (CALC)
LH SERPL-ACNC: 5.1 MIU/ML (ref 1.5–9.3)
MCH RBC QN AUTO: 30.4 PG (ref 27–33)
MCHC RBC AUTO-ENTMCNC: 32.1 G/DL (ref 32–36)
MCV RBC AUTO: 94.8 FL (ref 80–100)
METANEPH FREE SERPL-MCNC: 32 PG/ML
METANEPHS SERPL-MCNC: 228 PG/ML
NONHDLC SERPL-MCNC: 169 MG/DL (CALC)
NORMETANEPH FREE SERPL-MCNC: 196 PG/ML
PLATELET # BLD AUTO: 282 THOUSAND/UL (ref 140–400)
PMV BLD REES-ECKER: 9.7 FL (ref 7.5–12.5)
POTASSIUM SERPL-SCNC: 4.7 MMOL/L (ref 3.5–5.3)
PROLACTIN SERPL-MCNC: 5.7 NG/ML (ref 2–18)
PROT SERPL-MCNC: 7 G/DL (ref 6.1–8.1)
RBC # BLD AUTO: 4.97 MILLION/UL (ref 4.2–5.8)
SODIUM SERPL-SCNC: 141 MMOL/L (ref 135–146)
TESTOST FREE SERPL-MCNC: 73.7 PG/ML (ref 35–155)
TESTOST SERPL-MCNC: 585 NG/DL (ref 250–1100)
TRIGL SERPL-MCNC: 73 MG/DL
WBC # BLD AUTO: 3.8 THOUSAND/UL (ref 3.8–10.8)

## 2025-03-04 ENCOUNTER — APPOINTMENT (OUTPATIENT)
Dept: UROLOGY | Facility: CLINIC | Age: 45
End: 2025-03-04
Payer: COMMERCIAL

## 2025-03-04 LAB
HCT VFR BLD AUTO: 45.6 % (ref 38.5–50)
LH SERPL-ACNC: 6.6 MIU/ML (ref 1.5–9.3)
PROLACTIN SERPL-MCNC: 6.4 NG/ML (ref 2–18)
PSA SERPL-MCNC: 0.72 NG/ML
TESTOST SERPL-MCNC: 571 NG/DL (ref 250–827)

## 2025-03-07 ENCOUNTER — APPOINTMENT (OUTPATIENT)
Dept: PRIMARY CARE | Facility: CLINIC | Age: 45
End: 2025-03-07
Payer: COMMERCIAL

## 2025-03-07 VITALS
OXYGEN SATURATION: 99 % | SYSTOLIC BLOOD PRESSURE: 120 MMHG | DIASTOLIC BLOOD PRESSURE: 84 MMHG | WEIGHT: 190.2 LBS | HEART RATE: 102 BPM | BODY MASS INDEX: 23.77 KG/M2 | TEMPERATURE: 97.8 F

## 2025-03-07 DIAGNOSIS — E29.1 HYPOGONADISM IN MALE: Primary | ICD-10-CM

## 2025-03-07 DIAGNOSIS — F51.04 PSYCHOPHYSIOLOGICAL INSOMNIA: ICD-10-CM

## 2025-03-07 DIAGNOSIS — E78.2 MIXED HYPERLIPIDEMIA: ICD-10-CM

## 2025-03-07 DIAGNOSIS — G89.29 CHRONIC RIGHT SHOULDER PAIN: ICD-10-CM

## 2025-03-07 DIAGNOSIS — N52.9 ERECTILE DYSFUNCTION, UNSPECIFIED ERECTILE DYSFUNCTION TYPE: ICD-10-CM

## 2025-03-07 DIAGNOSIS — M25.511 CHRONIC RIGHT SHOULDER PAIN: ICD-10-CM

## 2025-03-07 PROCEDURE — 1036F TOBACCO NON-USER: CPT | Performed by: STUDENT IN AN ORGANIZED HEALTH CARE EDUCATION/TRAINING PROGRAM

## 2025-03-07 PROCEDURE — 99214 OFFICE O/P EST MOD 30 MIN: CPT | Performed by: STUDENT IN AN ORGANIZED HEALTH CARE EDUCATION/TRAINING PROGRAM

## 2025-03-07 ASSESSMENT — ENCOUNTER SYMPTOMS
CONSTIPATION: 0
HEADACHES: 0
WHEEZING: 0
SHORTNESS OF BREATH: 0
ABDOMINAL PAIN: 0
FREQUENCY: 0
DEPRESSION: 0
DIZZINESS: 0
PALPITATIONS: 0
NAUSEA: 0
OCCASIONAL FEELINGS OF UNSTEADINESS: 0
FATIGUE: 0
DYSURIA: 0
NUMBNESS: 0
HEMATURIA: 0
CHILLS: 0
DIARRHEA: 0
DYSPHORIC MOOD: 0
NERVOUS/ANXIOUS: 0
FEVER: 0
COUGH: 0

## 2025-03-07 ASSESSMENT — PATIENT HEALTH QUESTIONNAIRE - PHQ9
2. FEELING DOWN, DEPRESSED OR HOPELESS: NOT AT ALL
2. FEELING DOWN, DEPRESSED OR HOPELESS: NOT AT ALL
SUM OF ALL RESPONSES TO PHQ9 QUESTIONS 1 AND 2: 0
1. LITTLE INTEREST OR PLEASURE IN DOING THINGS: NOT AT ALL
SUM OF ALL RESPONSES TO PHQ9 QUESTIONS 1 AND 2: 0
1. LITTLE INTEREST OR PLEASURE IN DOING THINGS: NOT AT ALL

## 2025-03-07 NOTE — PROGRESS NOTES
Subjective   Patient ID: Asad Juan Jr. is a 44 y.o. male who presents for Follow-up.    HPI   Patient here for f/up.   Sleep wise he is sleeping but has been taking lorazepam. He has been reading books about insomnia would like to hold off on any refills of the lorazepam.   Dr WATSON has him set up with a psychologist to see if there is a mental health reason for his erectile dysfunction.  Blood work performed by both urology and endocrinology did not show any reasons for his symptoms.  He did have a mildly elevated metanephrine but is on Strattera which can elevate those.  His lipid panel did show an LDL in the 150s, roughly the same as 2 years ago.  He has been on an over-the-counter testosterone supplement which could possibly raise his cholesterol.  He has been off of that for over a month.  Testosterone levels were normal.  To his knowledge, no family history of hyperlipidemia and does not eat a lot of fat or carbohydrates.    He also has chronic right shoulder pain.  He is right-handed.  He previously saw Ortho and an MRI showed moderate AC arthritis and mild rotator cuff tendinitis.  He has pain in his wrist extensors, biceps, and pec.  It has improved as he has been holding off on lifting for the last couple weeks because of a wrist injury.    Previously…  Since he saw me 2 weeks ago, he went 120 hours without sleep.  He stopped his OTC testosterone supplement at that time.  Prior to that, starting about 2 months ago, is when he really started having problems sleeping.  In college, 1 night, he lost sensation to his penis. He could still get an erection and orgasm but it wasn't pleasurable.  That persisted after that.  Cialis helped a little bit but didn't fix it. He learned to live with it.   He had other symptoms that started at the same time. Had severe depression that started at the same time his senior year of college. He had a sense of shame inside. He felt uncomfortable with himself. He didn't  "enjoy things but was still social and did well in school. He stopped exercising and lost a lot of weight. He went home for 2 months and gained 60 lbs in 2 months. 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 He was better then b/c he was so busy.  He then injured his back and that caused a lot of issues with pain.  After that, he started isolating himself, but to others he was very successful and doing well.    3rd year of law school he got a little better. Late 20s he got depressed again and gained 60 lbs in 2 months. He then became obsessed with working out, his weight, food, and how he looked. Lost a lot of weight but his body was never the same. He couldn't gain muscle and had gynecomastia.  Throughout all of this, he was still having his sexual issues.  At 40, he started taking pills from [employer], maybe \"max prime\". His symptoms improved. Sexually it didn't fix anything but it improved. He had more libido.  At the gym he had more energy and could gain muscle. He started losing body fat. 3 months ago, he felt great again. Felt good with his body.  Confident.  2 months ago, he felt worse.  He started not being able to feel his penis again.  He had always had less sensation but starting then it went back to how it was in college.  Lost confience again. He hadn't been able to concentrate. Was reading 3-4 books a month and now has not read a book.  Once the sexual dysfunction started he started getting insomnia.  It started that he would fall asleep and woke up and couldn't go back to sleep.   He stopped taking his OTC testosterone pills 2 weeks ago. Sexually, he has no libido and physically he has no reaction.  Mentally he still has interest and wants to have sex but he is not having any reactions.  The first 4 nights after he stopped the pills, he was getting 90 minutes of sleep. Since then he hasn't really slept much at all. Has tried melatonin, nyquil, benadryl, hydroxyzine and nothing helped.  Went to the ED on Friday. " Blood work negative. They gave him 1 mg of ativan, it made him feel calmer but didn't put him to sleep. They gave him 30 pills of ativan. He did fall asleep 2 nights since then.   He does not feel that this is a mental issue as much as he feels that it is a testosterone or sex hormone issue.    With the sleep issue, he wanted to sleep and tried to sleep but was unable to.  No signs or symptoms of yannick.    Review of Systems   Constitutional:  Negative for chills, fatigue and fever.   Respiratory:  Negative for cough, shortness of breath and wheezing.    Cardiovascular:  Negative for chest pain, palpitations and leg swelling.   Gastrointestinal:  Negative for abdominal pain, constipation, diarrhea and nausea.   Genitourinary:  Negative for dysuria, frequency, hematuria and urgency.   Neurological:  Negative for dizziness, numbness and headaches.   Psychiatric/Behavioral:  Negative for dysphoric mood. The patient is not nervous/anxious.        Objective   /84 (BP Location: Left arm, Patient Position: Sitting, BP Cuff Size: Adult)   Pulse 102   Temp 36.6 °C (97.8 °F) (Temporal)   Wt 86.3 kg (190 lb 3.2 oz)   SpO2 99%   BMI 23.77 kg/m²     Physical Exam  Vitals reviewed.   Constitutional:       Appearance: Normal appearance.   HENT:      Head: Normocephalic and atraumatic.   Eyes:      Extraocular Movements: Extraocular movements intact.      Pupils: Pupils are equal, round, and reactive to light.   Cardiovascular:      Rate and Rhythm: Normal rate and regular rhythm.      Heart sounds: Normal heart sounds. No murmur heard.  Pulmonary:      Effort: Pulmonary effort is normal.      Breath sounds: Normal breath sounds. No wheezing.   Musculoskeletal:         General: Normal range of motion.      Right shoulder: Normal. No tenderness or bony tenderness. Normal range of motion.      Left shoulder: Normal.      Right upper arm: Normal. No tenderness.      Left upper arm: Normal.      Right elbow: Tenderness  present in medial epicondyle and lateral epicondyle. No radial head or olecranon process tenderness.      Left elbow: Normal.      Right forearm: Normal.      Left forearm: Normal.      Right wrist: Normal.      Left wrist: Normal.   Skin:     General: Skin is warm and dry.   Neurological:      General: No focal deficit present.      Mental Status: He is alert and oriented to person, place, and time.   Psychiatric:         Attention and Perception: Attention normal.         Mood and Affect: Mood normal.         Speech: Speech normal.         Behavior: Behavior normal.         Thought Content: Thought content normal.         Cognition and Memory: Cognition and memory normal.         Assessment/Plan   Problem List Items Addressed This Visit       Erectile dysfunction     Other Visit Diagnoses       Hypogonadism in male    -  Primary    Relevant Orders    Testosterone, total and free    Psychophysiological insomnia        Chronic right shoulder pain        Mixed hyperlipidemia              Patient is going to see the psychologist recommended by urology in about a week and a half.  Blood work came back normal.  Will keep an eye on his cholesterol and recheck with his physical next year.  Will recheck his testosterone in a month as he will have been off of his testosterone supplement for over 2 months at that time.  He is going to use the hydroxyzine if needed for insomnia but feels that he may have a good handle on that at this time.  Home exercises given for his shoulder    Claudine Corrigan,   3/7/2025

## 2025-03-17 ENCOUNTER — APPOINTMENT (OUTPATIENT)
Dept: UROLOGY | Facility: HOSPITAL | Age: 45
End: 2025-03-17
Payer: COMMERCIAL

## 2025-03-20 ENCOUNTER — APPOINTMENT (OUTPATIENT)
Dept: UROLOGY | Facility: HOSPITAL | Age: 45
End: 2025-03-20
Payer: COMMERCIAL

## 2025-03-25 ENCOUNTER — APPOINTMENT (OUTPATIENT)
Dept: UROLOGY | Facility: HOSPITAL | Age: 45
End: 2025-03-25
Payer: COMMERCIAL

## 2025-03-31 ENCOUNTER — APPOINTMENT (OUTPATIENT)
Dept: UROLOGY | Facility: HOSPITAL | Age: 45
End: 2025-03-31
Payer: COMMERCIAL

## 2025-04-11 LAB
TESTOST FREE SERPL-MCNC: 84.6 PG/ML (ref 35–155)
TESTOST SERPL-MCNC: 604 NG/DL (ref 250–1100)

## 2025-04-17 ENCOUNTER — APPOINTMENT (OUTPATIENT)
Dept: UROLOGY | Facility: HOSPITAL | Age: 45
End: 2025-04-17
Payer: COMMERCIAL

## 2025-06-19 ENCOUNTER — APPOINTMENT (OUTPATIENT)
Dept: UROLOGY | Facility: HOSPITAL | Age: 45
End: 2025-06-19
Payer: COMMERCIAL

## 2025-06-27 ENCOUNTER — APPOINTMENT (OUTPATIENT)
Dept: ENDOCRINOLOGY | Facility: CLINIC | Age: 45
End: 2025-06-27
Payer: COMMERCIAL

## 2025-07-01 ENCOUNTER — APPOINTMENT (OUTPATIENT)
Dept: NEUROLOGY | Facility: CLINIC | Age: 45
End: 2025-07-01
Payer: COMMERCIAL

## 2025-07-01 VITALS
HEIGHT: 75 IN | WEIGHT: 187 LBS | BODY MASS INDEX: 23.25 KG/M2 | TEMPERATURE: 97.1 F | SYSTOLIC BLOOD PRESSURE: 135 MMHG | DIASTOLIC BLOOD PRESSURE: 82 MMHG | HEART RATE: 62 BPM

## 2025-07-01 DIAGNOSIS — G58.8 NEURALGIA OF BOTH PUDENDAL NERVES: Primary | ICD-10-CM

## 2025-07-01 PROCEDURE — 99205 OFFICE O/P NEW HI 60 MIN: CPT | Performed by: PSYCHIATRY & NEUROLOGY

## 2025-07-01 PROCEDURE — 3008F BODY MASS INDEX DOCD: CPT | Performed by: PSYCHIATRY & NEUROLOGY

## 2025-07-01 PROCEDURE — 1036F TOBACCO NON-USER: CPT | Performed by: PSYCHIATRY & NEUROLOGY

## 2025-07-01 NOTE — PROGRESS NOTES
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  Otherwise nonfocal neurological exam outside of brisk lower extremity reflexes.    PLAN:  I ordered a pelvic MRI to rule out structural pathology that would compress the pudendal nerve to add to his symptoms.  I can offer no medication for a lack of sensation.  Can continue Cialis as he has been doing.  He will need to follow up with Urology for the hypogonadism workup that was recommended.  I will follow up with him after the MRI.        Diony Newby Jr., M.D., FAAN     --------      Subjective     Asad Enoc Yessica Baeza is a 44 y.o. year old, right-handed male referred for reduced penile sensation.    HPI  He developed sudden onset of reduced penile sensation almost 20 years ago.  Despite the reduced penile sensation, he was able to achieve and maintain erection, and was able to have sex.   He was started on Cialis, and this helped him to maintain his erections.  He was also using a testosterone booster.  In this context he had mood disorder symptoms.    6-7 months ago, he lost more penile sensation.  He stopped taking the testosterone booster.  In addition, he lost his sexual urges.  He no longer awakens with erections.  Cialis is no longer helpful for starting or maintaining erections.  He has more mood issues, though not as severely as when he was younger.    He visited a urologist in 2/2025, who recommended workup for hypogonadism, and a neurological consultation.    At the time this happened, he developed severe insomnia and at one point, he was missing multiple nights of sleeping.  This led to an ED visit.  The insomnia has since improved.    He has  "chronic low back pain, though without radiation into the legs.    He denies other focal neurological symptoms including dysarthria, dysphagia, diplopia, focal weakness, other focal sensory change, ataxia, vertigo, or bowel/bladder incontinence, among others.      Review of Systems   All other systems reviewed and are negative.      Medical History[1]  Surgical History[2]  Social History     Tobacco Use    Smoking status: Never     Passive exposure: Never    Smokeless tobacco: Never   Substance Use Topics    Alcohol use: Yes     Comment: Rarely     family history includes No Known Problems in his mother; Skin cancer in his father; brain tumor in his father.  Current Medications[3]  Allergies[4]    Objective     /82   Pulse 62   Temp 36.2 °C (97.1 °F)   Ht 1.905 m (6' 3\")   Wt 84.8 kg (187 lb)   BMI 23.37 kg/m²     CONSTITUTIONAL:  No acute distress    SPINE:  No spine tenderness and no tightness of the paraspinals.    CARDIOVASCULAR:  Normal pulses in the distal legs, no edema of either arm or either leg.  No carotid bruits.    MENTAL STATUS:  Awake, alert, fully oriented to self, place, and time, with present short-term memory, good awareness of recent events, normal attention span, concentration, and fund of knowledge.    SPEECH AND LANGUAGE:  Can name and repeat, follows all commands, has no dysarthria    FUNDOSCOPIC:  No papilledema    CRANIAL NERVES:  II-Vision present, visual fields full to confrontational testing    III/IV/VI--EOMs are present in all directions.  Pupils are symmetrically reactive in dim light.  No ptosis.    V--Normal facial sensation.    VII--No facial asymmetry.    VIII--Hearing present to voice bilaterally.    IX/X--Symmetric soft palate rise.    XI--Normal trapezius power bilaterally.    XII--Tongue protrudes without deviation.    MOTOR:  Normal power, tone, and bulk in both arms and both legs.    SENSORY:  Reduced touch (soft end of cotton tipped applicator) and sharp sensation " on the penis and scrotum, more so on the right side than the left.  No saddle anesthesia.  Otherwise normal pin sensation in both arms and both legs without distal-proximal gradient, other asymmetry, or spinal sensory level.  Proprioception normal.    COORDINATION:  Normal finger-to-nose and heel-to-shin testing in both arms and both legs.    REFLEXES are normal and symmetric at the biceps, triceps, brachioradialis, brisker but not clonic at the patellae and ankles.  The plantar responses are flexor.    GAIT is normal, without steppage, ataxia, shuffling, or spasticity.        Diony Newby Jr., M.D., FAAN         [1]   Past Medical History:  Diagnosis Date    ADD (attention deficit disorder) 2004    ADHD (attention deficit hyperactivity disorder)     Anxiety December 2024    Depression, unspecified 12/08/2013    Depression    Insomnia December 2024    Irritable bowel syndrome without diarrhea 12/08/2013    Irritable bowel syndrome    Low back pain, unspecified 09/25/2015    Lumbago    Numbness 2002    Other conditions influencing health status 12/08/2013    Attention-deficit Hyperactivity Disorder    Panic disorder (episodic paroxysmal anxiety) 12/08/2013    Panic disorder without agoraphobia    Peripheral neuropathy 2002   [2]   Past Surgical History:  Procedure Laterality Date    CIRCUMCISION, PRIMARY      WISDOM TOOTH EXTRACTION     [3]   Current Outpatient Medications:     aspirin 500 mg buffered tablet, Take 1 tablet (500 mg) by mouth every 6 hours if needed for mild pain (1 - 3)., Disp: , Rfl:     atomoxetine (Strattera) 60 mg capsule, Take 1 capsule (60 mg) by mouth once daily., Disp: 90 capsule, Rfl: 3    hydrOXYzine HCL (Atarax) 25 mg tablet, Take 1-2 tablets (25-50 mg) by mouth as needed at bedtime for anxiety (Sleep). (Patient not taking: Reported on 7/1/2025), Disp: 60 tablet, Rfl: 2    LORazepam (Ativan) 1 mg tablet, Take 1 tablet (1 mg) by mouth every 8 hours if needed., Disp: , Rfl:      tadalafil (Cialis) 20 mg tablet, Take 1 tablet (20 mg) by mouth if needed for erectile dysfunction (Start with half tab. Take 2 hours prior to wanting erection.If you get an erection over 4 hours, go to the emergency room.)., Disp: 30 tablet, Rfl: 6    tadalafil (Cialis) 5 mg tablet, Take 1 tablet (5 mg) by mouth once daily., Disp: 30 tablet, Rfl: 11  [4] No Known Allergies

## 2025-07-02 LAB
ALBUMIN SERPL-MCNC: 4.8 G/DL (ref 3.6–5.1)
BUN SERPL-MCNC: 29 MG/DL (ref 7–25)
BUN/CREAT SERPL: 27 (CALC) (ref 6–22)
CALCIUM SERPL-MCNC: 9.7 MG/DL (ref 8.6–10.3)
CHLORIDE SERPL-SCNC: 102 MMOL/L (ref 98–110)
CO2 SERPL-SCNC: 26 MMOL/L (ref 20–32)
CREAT SERPL-MCNC: 1.06 MG/DL (ref 0.6–1.29)
EGFRCR SERPLBLD CKD-EPI 2021: 89 ML/MIN/1.73M2
GLUCOSE SERPL-MCNC: 89 MG/DL (ref 65–99)
PHOSPHATE SERPL-MCNC: 4.1 MG/DL (ref 2.5–4.5)
POTASSIUM SERPL-SCNC: 4.8 MMOL/L (ref 3.5–5.3)
SODIUM SERPL-SCNC: 139 MMOL/L (ref 135–146)

## 2025-07-09 ENCOUNTER — APPOINTMENT (OUTPATIENT)
Dept: RADIOLOGY | Facility: HOSPITAL | Age: 45
End: 2025-07-09
Payer: COMMERCIAL

## 2025-07-17 ENCOUNTER — APPOINTMENT (OUTPATIENT)
Dept: RADIOLOGY | Facility: HOSPITAL | Age: 45
End: 2025-07-17
Payer: COMMERCIAL

## 2025-07-17 DIAGNOSIS — G58.8 NEURALGIA OF BOTH PUDENDAL NERVES: ICD-10-CM

## 2025-07-17 PROCEDURE — A9575 INJ GADOTERATE MEGLUMI 0.1ML: HCPCS | Mod: JW | Performed by: PSYCHIATRY & NEUROLOGY

## 2025-07-17 PROCEDURE — 2550000001 HC RX 255 CONTRASTS: Mod: JW | Performed by: PSYCHIATRY & NEUROLOGY

## 2025-07-17 PROCEDURE — 72197 MRI PELVIS W/O & W/DYE: CPT

## 2025-07-17 RX ORDER — GADOTERATE MEGLUMINE 376.9 MG/ML
18 INJECTION INTRAVENOUS
Status: COMPLETED | OUTPATIENT
Start: 2025-07-17 | End: 2025-07-17

## 2025-07-17 RX ADMIN — GADOTERATE MEGLUMINE 18 ML: 376.9 INJECTION INTRAVENOUS at 17:23

## 2025-08-01 DIAGNOSIS — G58.8 NEURALGIA OF BOTH PUDENDAL NERVES: Primary | ICD-10-CM

## 2025-08-01 RX ORDER — PREDNISONE 10 MG/1
TABLET ORAL
Qty: 30 TABLET | Refills: 0 | Status: SHIPPED | OUTPATIENT
Start: 2025-08-01

## 2025-10-20 ENCOUNTER — APPOINTMENT (OUTPATIENT)
Dept: NEUROLOGY | Facility: CLINIC | Age: 45
End: 2025-10-20
Payer: COMMERCIAL

## 2026-01-12 ENCOUNTER — APPOINTMENT (OUTPATIENT)
Dept: PRIMARY CARE | Facility: CLINIC | Age: 46
End: 2026-01-12
Payer: COMMERCIAL